# Patient Record
Sex: FEMALE | Race: WHITE | HISPANIC OR LATINO | URBAN - METROPOLITAN AREA
[De-identification: names, ages, dates, MRNs, and addresses within clinical notes are randomized per-mention and may not be internally consistent; named-entity substitution may affect disease eponyms.]

---

## 2017-06-08 ENCOUNTER — EMERGENCY (EMERGENCY)
Facility: HOSPITAL | Age: 27
LOS: 1 days | Discharge: HOME | End: 2017-06-08
Attending: EMERGENCY MEDICINE
Payer: MEDICAID

## 2017-06-08 VITALS
HEART RATE: 80 BPM | OXYGEN SATURATION: 100 % | SYSTOLIC BLOOD PRESSURE: 127 MMHG | HEIGHT: 64 IN | WEIGHT: 214.95 LBS | RESPIRATION RATE: 18 BRPM | DIASTOLIC BLOOD PRESSURE: 86 MMHG | TEMPERATURE: 98 F

## 2017-06-08 DIAGNOSIS — Z79.899 OTHER LONG TERM (CURRENT) DRUG THERAPY: ICD-10-CM

## 2017-06-08 DIAGNOSIS — K62.5 HEMORRHAGE OF ANUS AND RECTUM: ICD-10-CM

## 2017-06-08 DIAGNOSIS — Z98.890 OTHER SPECIFIED POSTPROCEDURAL STATES: ICD-10-CM

## 2017-06-08 DIAGNOSIS — R10.9 UNSPECIFIED ABDOMINAL PAIN: ICD-10-CM

## 2017-06-08 DIAGNOSIS — Z98.1 ARTHRODESIS STATUS: ICD-10-CM

## 2017-06-08 DIAGNOSIS — K64.8 OTHER HEMORRHOIDS: ICD-10-CM

## 2017-06-08 LAB
ALBUMIN SERPL ELPH-MCNC: 4.5 G/DL — SIGNIFICANT CHANGE UP (ref 3.3–5.2)
ALP SERPL-CCNC: 53 U/L — SIGNIFICANT CHANGE UP (ref 40–120)
ALT FLD-CCNC: 15 U/L — SIGNIFICANT CHANGE UP
ANION GAP SERPL CALC-SCNC: 17 MMOL/L — SIGNIFICANT CHANGE UP (ref 5–17)
APPEARANCE UR: CLEAR — SIGNIFICANT CHANGE UP
APTT BLD: 43.1 SEC — HIGH (ref 27.5–37.4)
AST SERPL-CCNC: 12 U/L — SIGNIFICANT CHANGE UP
BACTERIA # UR AUTO: ABNORMAL
BASOPHILS # BLD AUTO: 0 K/UL — SIGNIFICANT CHANGE UP (ref 0–0.2)
BASOPHILS NFR BLD AUTO: 0.3 % — SIGNIFICANT CHANGE UP (ref 0–2)
BILIRUB SERPL-MCNC: 0.5 MG/DL — SIGNIFICANT CHANGE UP (ref 0.4–2)
BILIRUB UR-MCNC: NEGATIVE — SIGNIFICANT CHANGE UP
BUN SERPL-MCNC: 13 MG/DL — SIGNIFICANT CHANGE UP (ref 8–20)
CALCIUM SERPL-MCNC: 9.4 MG/DL — SIGNIFICANT CHANGE UP (ref 8.6–10.2)
CHLORIDE SERPL-SCNC: 98 MMOL/L — SIGNIFICANT CHANGE UP (ref 98–107)
CO2 SERPL-SCNC: 24 MMOL/L — SIGNIFICANT CHANGE UP (ref 22–29)
COLOR SPEC: YELLOW — SIGNIFICANT CHANGE UP
CREAT SERPL-MCNC: 0.68 MG/DL — SIGNIFICANT CHANGE UP (ref 0.5–1.3)
DIFF PNL FLD: NEGATIVE — SIGNIFICANT CHANGE UP
EOSINOPHIL # BLD AUTO: 0.2 K/UL — SIGNIFICANT CHANGE UP (ref 0–0.5)
EOSINOPHIL NFR BLD AUTO: 1.5 % — SIGNIFICANT CHANGE UP (ref 0–6)
EPI CELLS # UR: SIGNIFICANT CHANGE UP
GLUCOSE SERPL-MCNC: 85 MG/DL — SIGNIFICANT CHANGE UP (ref 70–115)
GLUCOSE UR QL: NEGATIVE MG/DL — SIGNIFICANT CHANGE UP
HCG SERPL-ACNC: <2 MIU/ML — SIGNIFICANT CHANGE UP
HCT VFR BLD CALC: 41.8 % — SIGNIFICANT CHANGE UP (ref 37–47)
HGB BLD-MCNC: 13.4 G/DL — SIGNIFICANT CHANGE UP (ref 12–16)
INR BLD: 1.11 RATIO — SIGNIFICANT CHANGE UP (ref 0.88–1.16)
KETONES UR-MCNC: NEGATIVE — SIGNIFICANT CHANGE UP
LEUKOCYTE ESTERASE UR-ACNC: NEGATIVE — SIGNIFICANT CHANGE UP
LIDOCAIN IGE QN: 22 U/L — SIGNIFICANT CHANGE UP (ref 22–51)
LYMPHOCYTES # BLD AUTO: 3.4 K/UL — SIGNIFICANT CHANGE UP (ref 1–4.8)
LYMPHOCYTES # BLD AUTO: 32.1 % — SIGNIFICANT CHANGE UP (ref 20–55)
MCHC RBC-ENTMCNC: 28.4 PG — SIGNIFICANT CHANGE UP (ref 27–31)
MCHC RBC-ENTMCNC: 32.1 G/DL — SIGNIFICANT CHANGE UP (ref 32–36)
MCV RBC AUTO: 88.6 FL — SIGNIFICANT CHANGE UP (ref 81–99)
MONOCYTES # BLD AUTO: 0.8 K/UL — SIGNIFICANT CHANGE UP (ref 0–0.8)
MONOCYTES NFR BLD AUTO: 7.2 % — SIGNIFICANT CHANGE UP (ref 3–10)
NEUTROPHILS # BLD AUTO: 6.1 K/UL — SIGNIFICANT CHANGE UP (ref 1.8–8)
NEUTROPHILS NFR BLD AUTO: 58.6 % — SIGNIFICANT CHANGE UP (ref 37–73)
NITRITE UR-MCNC: NEGATIVE — SIGNIFICANT CHANGE UP
PH UR: 6 — SIGNIFICANT CHANGE UP (ref 5–8)
PLATELET # BLD AUTO: 309 K/UL — SIGNIFICANT CHANGE UP (ref 150–400)
POTASSIUM SERPL-MCNC: 4 MMOL/L — SIGNIFICANT CHANGE UP (ref 3.5–5.3)
POTASSIUM SERPL-SCNC: 4 MMOL/L — SIGNIFICANT CHANGE UP (ref 3.5–5.3)
PROT SERPL-MCNC: 7.7 G/DL — SIGNIFICANT CHANGE UP (ref 6.6–8.7)
PROT UR-MCNC: NEGATIVE MG/DL — SIGNIFICANT CHANGE UP
PROTHROM AB SERPL-ACNC: 12.2 SEC — SIGNIFICANT CHANGE UP (ref 9.8–12.7)
RBC # BLD: 4.72 M/UL — SIGNIFICANT CHANGE UP (ref 4.4–5.2)
RBC # FLD: 13 % — SIGNIFICANT CHANGE UP (ref 11–15.6)
RBC CASTS # UR COMP ASSIST: NEGATIVE /HPF — SIGNIFICANT CHANGE UP (ref 0–4)
SODIUM SERPL-SCNC: 139 MMOL/L — SIGNIFICANT CHANGE UP (ref 135–145)
SP GR SPEC: 1.01 — SIGNIFICANT CHANGE UP (ref 1.01–1.02)
UROBILINOGEN FLD QL: NEGATIVE MG/DL — SIGNIFICANT CHANGE UP
WBC # BLD: 10.4 K/UL — SIGNIFICANT CHANGE UP (ref 4.8–10.8)
WBC # FLD AUTO: 10.4 K/UL — SIGNIFICANT CHANGE UP (ref 4.8–10.8)
WBC UR QL: SIGNIFICANT CHANGE UP

## 2017-06-08 PROCEDURE — 99284 EMERGENCY DEPT VISIT MOD MDM: CPT

## 2017-06-08 RX ORDER — METOCLOPRAMIDE HCL 10 MG
10 TABLET ORAL ONCE
Qty: 0 | Refills: 0 | Status: COMPLETED | OUTPATIENT
Start: 2017-06-08 | End: 2017-06-08

## 2017-06-08 RX ORDER — PANTOPRAZOLE SODIUM 20 MG/1
10 TABLET, DELAYED RELEASE ORAL ONCE
Qty: 0 | Refills: 0 | Status: COMPLETED | OUTPATIENT
Start: 2017-06-08 | End: 2017-06-08

## 2017-06-08 RX ORDER — SODIUM CHLORIDE 9 MG/ML
1000 INJECTION INTRAMUSCULAR; INTRAVENOUS; SUBCUTANEOUS
Qty: 0 | Refills: 0 | Status: DISCONTINUED | OUTPATIENT
Start: 2017-06-08 | End: 2017-06-12

## 2017-06-08 RX ADMIN — SODIUM CHLORIDE 200 MILLILITER(S): 9 INJECTION INTRAMUSCULAR; INTRAVENOUS; SUBCUTANEOUS at 21:35

## 2017-06-08 RX ADMIN — PANTOPRAZOLE SODIUM 10 MILLIGRAM(S): 20 TABLET, DELAYED RELEASE ORAL at 21:32

## 2017-06-08 RX ADMIN — Medication 10 MILLIGRAM(S): at 21:32

## 2017-06-08 NOTE — ED STATDOCS - PROGRESS NOTE DETAILS
Pt denies nay abdominal pain, dizziness, or rectal pain. Pt Labs reviewed and all seems to be within normal limits. Rectal examination and Guaiac examination to be performed by TOBY Magana. joanna michaud note: Rectal exam: (CHANCE CHAPERONE) two external hemorrhoids noted. external rectum looks mildly erythematous. good rectal tone. no internal hemorrhoids noted. soft brown stool noted in rectal vault.

## 2017-06-08 NOTE — ED STATDOCS - MUSCULOSKELETAL FINDINGS, MLM
spine is midline. no leg edema. no calf tenderness./normal range of motion/no muscle tenderness/neck supple

## 2017-06-08 NOTE — ED STATDOCS - ATTENDING CONTRIBUTION TO CARE
I, Chad Sullivan, performed the initial face to face bedside interview with this patient regarding history of present illness, review of symptoms and relevant past medical, social and family history.  I completed an independent physical examination.  I was the initial provider who evaluated this patient. I have signed out the follow up of any pending tests (i.e. labs, radiological studies) to the ACP.  I have communicated the patient’s plan of care and disposition with the ACP.  The history, relevant review of systems, past medical and surgical history, medical decision making, and physical examination was documented by the scribe in my presence and I attest to the accuracy of the documentation.

## 2017-06-08 NOTE — ED STATDOCS - CARE PLAN
Principal Discharge DX:	IBS (irritable bowel syndrome) Principal Discharge DX:	Hemorrhoids  Instructions for follow-up, activity and diet:	Increase Fibre diets and F/u with Hematologist Principal Discharge DX:	Hemorrhoids  Instructions for follow-up, activity and diet:	Increase Fibre diets and F/u with Hematologist  Secondary Diagnosis:	Rectal bleeding

## 2017-06-08 NOTE — ED STATDOCS - NS ED MD SCRIBE ATTENDING SCRIBE SECTIONS
HISTORY OF PRESENT ILLNESS/PAST MEDICAL/SURGICAL/SOCIAL HISTORY/VITAL SIGNS( Pullset)/REVIEW OF SYSTEMS/INTAKE ASSESSMENT/SCREENINGS/HIV/PHYSICAL EXAM/DISPOSITION

## 2017-06-08 NOTE — ED STATDOCS - OBJECTIVE STATEMENT
26 year old female, with hx of IBS, presenting to the ED complaining of abdominal cramps, lower back pain, and bleeding from her rectum. Pt states that she had last eaten at 1400 today, and when she went to move her bowels at 1800, she noticed bleeding from her rectum. Pt states that she developed abdominal pain afterwards. She states that she visited an urgent care center and was sent to the ED for further evaluation. Pt states that she has nausea, but denies having any rectal pain, pain with moving bowels, fever, chills, vomiting, or dysuria. She states that she is not taking any medications and that she does not have any PSHx or PMHx. Pt states that she has NKDA. She states that she is a non-smoker, and non-drinker. No further complaints at this time.

## 2017-06-08 NOTE — ED ADULT NURSE NOTE - DISCHARGE TEACHING
patient givne d/c instructions and instructed to follow up with pmd and hem/onc and to return if worse

## 2017-06-08 NOTE — ED STATDOCS - CHPI ED SYMPTOM NEG
no fever/no dysuria/no vomiting/NO RECTAL PAIN, NO CHILLS no vomiting/no fever/no dysuria/no burning urination/NO RECTAL PAIN, NO CHILLS

## 2017-06-08 NOTE — ED ADULT NURSE NOTE - OBJECTIVE STATEMENT
c/o lower abdominal stomach pain and cramps, that began tonight 1800, reports nausea and anal bleeding, denies any trauma, reports hx of IBS. Denies daily medication use.

## 2017-06-08 NOTE — ED STATDOCS - MEDICAL DECISION MAKING DETAILS
Episode of blood tinged stool x 2 with subsequent suprapubic cramping and nausea. Hx of IBS. Suspicious for same. Sent [place] [facility] [place] work. CBC for anaemia. CMP for liver function. Urine for blood/UTI. Hydrate, treat symptomatically. Formal rectal exam, and reassess.

## 2017-06-09 VITALS
OXYGEN SATURATION: 98 % | HEART RATE: 71 BPM | RESPIRATION RATE: 20 BRPM | DIASTOLIC BLOOD PRESSURE: 81 MMHG | TEMPERATURE: 98 F | SYSTOLIC BLOOD PRESSURE: 119 MMHG

## 2017-06-09 LAB — OB PNL STL: POSITIVE

## 2017-06-09 PROCEDURE — 82272 OCCULT BLD FECES 1-3 TESTS: CPT

## 2017-06-09 PROCEDURE — 85027 COMPLETE CBC AUTOMATED: CPT

## 2017-06-09 PROCEDURE — 83690 ASSAY OF LIPASE: CPT

## 2017-06-09 PROCEDURE — 99284 EMERGENCY DEPT VISIT MOD MDM: CPT | Mod: 25

## 2017-06-09 PROCEDURE — 96374 THER/PROPH/DIAG INJ IV PUSH: CPT

## 2017-06-09 PROCEDURE — 85730 THROMBOPLASTIN TIME PARTIAL: CPT

## 2017-06-09 PROCEDURE — 85610 PROTHROMBIN TIME: CPT

## 2017-06-09 PROCEDURE — 84702 CHORIONIC GONADOTROPIN TEST: CPT

## 2017-06-09 PROCEDURE — 80053 COMPREHEN METABOLIC PANEL: CPT

## 2017-06-09 PROCEDURE — 81001 URINALYSIS AUTO W/SCOPE: CPT

## 2017-06-09 PROCEDURE — 96375 TX/PRO/DX INJ NEW DRUG ADDON: CPT

## 2017-06-10 ENCOUNTER — TRANSCRIPTION ENCOUNTER (OUTPATIENT)
Age: 27
End: 2017-06-10

## 2017-12-05 NOTE — ED ADULT TRIAGE NOTE - CHIEF COMPLAINT QUOTE
Assessment:  EPIRETINAL MEMBRANE    Plan:  Take home [employer].  Patient to call immediately with any change and   Scan Macular OCT,   21 LINE RASTER SHOWS , THICKENED RETINA, AND , MACULAR PUCKER , AND NO HOLE, STABLE TO LAST VISIT 11/6/2017 CENTRAL 449/297, TODAY  453/304   c/o stomach pain and cramps, nausea and anal bleeding, denies any trauman  hx of IBS

## 2020-02-26 PROBLEM — K58.9 IRRITABLE BOWEL SYNDROME WITHOUT DIARRHEA: Chronic | Status: ACTIVE | Noted: 2017-06-08

## 2020-03-09 ENCOUNTER — APPOINTMENT (OUTPATIENT)
Dept: PEDIATRIC CARDIOLOGY | Facility: CLINIC | Age: 30
End: 2020-03-09
Payer: MEDICAID

## 2020-03-09 PROCEDURE — 99201 OFFICE OUTPATIENT NEW 10 MINUTES: CPT | Mod: 25

## 2020-03-09 PROCEDURE — 76825 ECHO EXAM OF FETAL HEART: CPT

## 2020-03-09 PROCEDURE — 93325 DOPPLER ECHO COLOR FLOW MAPG: CPT

## 2020-03-09 PROCEDURE — 76827 ECHO EXAM OF FETAL HEART: CPT

## 2021-01-13 ENCOUNTER — TRANSCRIPTION ENCOUNTER (OUTPATIENT)
Age: 31
End: 2021-01-13

## 2022-10-05 PROBLEM — Z00.00 ENCOUNTER FOR PREVENTIVE HEALTH EXAMINATION: Noted: 2022-10-05

## 2022-10-17 ENCOUNTER — APPOINTMENT (OUTPATIENT)
Dept: ORTHOPEDIC SURGERY | Facility: CLINIC | Age: 32
End: 2022-10-17

## 2022-10-17 VITALS — BODY MASS INDEX: 39.27 KG/M2 | WEIGHT: 230 LBS | HEIGHT: 64 IN

## 2022-10-17 DIAGNOSIS — M22.42 CHONDROMALACIA PATELLAE, RIGHT KNEE: ICD-10-CM

## 2022-10-17 DIAGNOSIS — M22.41 CHONDROMALACIA PATELLAE, RIGHT KNEE: ICD-10-CM

## 2022-10-17 PROCEDURE — 99203 OFFICE O/P NEW LOW 30 MIN: CPT

## 2022-10-17 PROCEDURE — 73562 X-RAY EXAM OF KNEE 3: CPT | Mod: 50

## 2022-10-17 PROCEDURE — 73620 X-RAY EXAM OF FOOT: CPT | Mod: 50

## 2022-10-17 PROCEDURE — 73610 X-RAY EXAM OF ANKLE: CPT | Mod: 50

## 2022-10-17 NOTE — DISCUSSION/SUMMARY
[de-identified] : Would do PT for knees. Ankles/feet need to be supported with orthotic/good sneaker

## 2022-10-17 NOTE — HISTORY OF PRESENT ILLNESS
[10] : 10 [7] : 7 [Radiating] : radiating [Constant] : constant [Sleep] : sleep [de-identified] : Has pain both knees for long period of time, no recent injury. No clicking, locking or giving way. Also has pain in both feet/ankles.  [] : no [FreeTextEntry1] : b/l knees/feet [FreeTextEntry3] : about a year  [FreeTextEntry5] : no injury, patient is feeling increasing pain in the knees/feet. Left side is worse than right.

## 2022-12-12 ENCOUNTER — APPOINTMENT (OUTPATIENT)
Dept: ORTHOPEDIC SURGERY | Facility: CLINIC | Age: 32
End: 2022-12-12

## 2023-03-16 ENCOUNTER — APPOINTMENT (OUTPATIENT)
Dept: OBGYN | Facility: CLINIC | Age: 33
End: 2023-03-16
Payer: MEDICAID

## 2023-03-16 VITALS
HEART RATE: 102 BPM | WEIGHT: 245 LBS | BODY MASS INDEX: 41.83 KG/M2 | DIASTOLIC BLOOD PRESSURE: 74 MMHG | SYSTOLIC BLOOD PRESSURE: 118 MMHG | HEIGHT: 64 IN

## 2023-03-16 DIAGNOSIS — G43.909 MIGRAINE, UNSPECIFIED, NOT INTRACTABLE, W/OUT STATUS MIGRAINOSUS: ICD-10-CM

## 2023-03-16 DIAGNOSIS — Z83.3 FAMILY HISTORY OF DIABETES MELLITUS: ICD-10-CM

## 2023-03-16 DIAGNOSIS — R63.8 OTHER SYMPTOMS AND SIGNS CONCERNING FOOD AND FLUID INTAKE: ICD-10-CM

## 2023-03-16 DIAGNOSIS — U07.1 COVID-19: ICD-10-CM

## 2023-03-16 DIAGNOSIS — Z82.49 FAMILY HISTORY OF ISCHEMIC HEART DISEASE AND OTHER DISEASES OF THE CIRCULATORY SYSTEM: ICD-10-CM

## 2023-03-16 DIAGNOSIS — M54.30 SCIATICA, UNSPECIFIED SIDE: ICD-10-CM

## 2023-03-16 DIAGNOSIS — Z98.890 OTHER SPECIFIED POSTPROCEDURAL STATES: ICD-10-CM

## 2023-03-16 DIAGNOSIS — M51.26 OTHER INTERVERTEBRAL DISC DISPLACEMENT, LUMBAR REGION: ICD-10-CM

## 2023-03-16 PROCEDURE — 0500F INITIAL PRENATAL CARE VISIT: CPT

## 2023-03-16 RX ORDER — PRENATAL VIT 49/IRON FUM/FOLIC 6.75-0.2MG
TABLET ORAL
Refills: 0 | Status: ACTIVE | COMMUNITY

## 2023-03-16 NOTE — PLAN
[FreeTextEntry1] : Patient is a 32-year-old  3 para 1-0-1-1 last menstrual period 2022 with an EDC of 2023\par Patient presents for initial OB visit as a transfer patient for her continued prenatal care\par Physical exam reveals a well-developed well-nourished female no apparent distress,,, BMI 43\par Heart regular rhythm and rate, lungs clear, breast no mass nontender no skin changes or nipple discharge no adenopathy, abdomen soft nontender evidence of a gravid uterus\par Pelvic Yoel shows normal female external genitalia, vagina lesions, cervix appropriate size closed not tender, uterus anteverted bulky consistent with a gravid uterus\par Pap smear not performed\par Patient will transfer her prenatal records including Pap smear results and all blood work from her previous office to have continued care at this point\par Patient is scheduled for level 2 sonogram and appropriate paperwork and referrals given for her to be referred to maternal-fetal medicine at [facility]\par \par Past medical history,,, anemia, history of COVID back in 2021 denies being vaccinated\par Past surgical history ,,, D&C x1\par Allergies,,, patient denies\par Medications,,, prenatal vitamins,, patient is advised to consider adding folic acid supplement\par Past OB history,, vaginal delivery x1 with a vacuum assistance, spontaneous AB x1\par Past GYN history,,, menarche age 7, tubal 27, duration 5 to 6 days, patient denies a history of birth control pill use, history of PID or STDs\par Tobacco use,,, patient denies\par Alcohol use,,, patient denies\par Drug use,,, patient denies\par Family history,, mother alive  with history of diabetes mellitus and hypertension,, father alive history hypertension\par Patient denies any family history of breast or ovarian cancer

## 2023-03-16 NOTE — HISTORY OF PRESENT ILLNESS
[FreeTextEntry1] : Patient is a 32-year-old  3 para 1-0-1-1 last menstrual period 2022 with an EDC of 2023\par Patient presents as a transfer OB patient for her continued prenatal care

## 2023-03-16 NOTE — PHYSICAL EXAM
[Chaperone Present] : A chaperone was present in the examining room during all aspects of the physical examination [Appropriately responsive] : appropriately responsive [Alert] : alert [No Acute Distress] : no acute distress [No Lymphadenopathy] : no lymphadenopathy [Regular Rate Rhythm] : regular rate rhythm [No Murmurs] : no murmurs [Clear to Auscultation B/L] : clear to auscultation bilaterally [Soft] : soft [Non-tender] : non-tender [Non-distended] : non-distended [No HSM] : No HSM [No Lesions] : no lesions [Oriented x3] : oriented x3 [FreeTextEntry7] : Gravid [Examination Of The Breasts] : a normal appearance [No Masses] : no breast masses were palpable [Labia Majora] : normal [Labia Minora] : normal [Normal] : normal [Tenderness] : nontender [Mass ___ cm] : no uterine mass was palpated [FreeTextEntry6] : Gravid

## 2023-03-17 ENCOUNTER — NON-APPOINTMENT (OUTPATIENT)
Age: 33
End: 2023-03-17

## 2023-03-23 ENCOUNTER — ASOB RESULT (OUTPATIENT)
Age: 33
End: 2023-03-23

## 2023-03-23 ENCOUNTER — NON-APPOINTMENT (OUTPATIENT)
Age: 33
End: 2023-03-23

## 2023-03-23 ENCOUNTER — APPOINTMENT (OUTPATIENT)
Dept: ANTEPARTUM | Facility: CLINIC | Age: 33
End: 2023-03-23
Payer: MEDICAID

## 2023-03-23 PROCEDURE — 76811 OB US DETAILED SNGL FETUS: CPT

## 2023-04-13 ENCOUNTER — NON-APPOINTMENT (OUTPATIENT)
Age: 33
End: 2023-04-13

## 2023-04-13 ENCOUNTER — APPOINTMENT (OUTPATIENT)
Dept: OBGYN | Facility: CLINIC | Age: 33
End: 2023-04-13
Payer: MEDICAID

## 2023-04-13 VITALS
HEART RATE: 121 BPM | HEIGHT: 64 IN | DIASTOLIC BLOOD PRESSURE: 80 MMHG | WEIGHT: 246 LBS | BODY MASS INDEX: 42 KG/M2 | SYSTOLIC BLOOD PRESSURE: 126 MMHG

## 2023-04-13 PROCEDURE — 0502F SUBSEQUENT PRENATAL CARE: CPT

## 2023-04-14 ENCOUNTER — APPOINTMENT (OUTPATIENT)
Dept: ANTEPARTUM | Facility: CLINIC | Age: 33
End: 2023-04-14
Payer: MEDICAID

## 2023-04-14 ENCOUNTER — ASOB RESULT (OUTPATIENT)
Age: 33
End: 2023-04-14

## 2023-04-14 PROCEDURE — 76816 OB US FOLLOW-UP PER FETUS: CPT

## 2023-04-17 ENCOUNTER — NON-APPOINTMENT (OUTPATIENT)
Age: 33
End: 2023-04-17

## 2023-05-12 ENCOUNTER — ASOB RESULT (OUTPATIENT)
Age: 33
End: 2023-05-12

## 2023-05-12 ENCOUNTER — APPOINTMENT (OUTPATIENT)
Dept: ANTEPARTUM | Facility: CLINIC | Age: 33
End: 2023-05-12
Payer: MEDICAID

## 2023-05-12 PROCEDURE — 76816 OB US FOLLOW-UP PER FETUS: CPT

## 2023-05-15 ENCOUNTER — APPOINTMENT (OUTPATIENT)
Dept: OBGYN | Facility: CLINIC | Age: 33
End: 2023-05-15
Payer: MEDICAID

## 2023-05-15 VITALS
WEIGHT: 251 LBS | HEART RATE: 108 BPM | SYSTOLIC BLOOD PRESSURE: 120 MMHG | DIASTOLIC BLOOD PRESSURE: 81 MMHG | BODY MASS INDEX: 43.08 KG/M2

## 2023-05-15 PROCEDURE — 0502F SUBSEQUENT PRENATAL CARE: CPT

## 2023-05-16 LAB
BILIRUB UR QL STRIP: NORMAL
GLUCOSE UR-MCNC: NORMAL
HCG UR QL: 0.2 EU/DL
HGB UR QL STRIP.AUTO: NORMAL
KETONES UR-MCNC: NORMAL
LEUKOCYTE ESTERASE UR QL STRIP: NORMAL
NITRITE UR QL STRIP: NORMAL
PH UR STRIP: 6
PROT UR STRIP-MCNC: NORMAL
SP GR UR STRIP: 1.03

## 2023-05-18 ENCOUNTER — APPOINTMENT (OUTPATIENT)
Dept: OBGYN | Facility: CLINIC | Age: 33
End: 2023-05-18
Payer: MEDICAID

## 2023-05-18 VITALS
BODY MASS INDEX: 43.02 KG/M2 | SYSTOLIC BLOOD PRESSURE: 122 MMHG | HEART RATE: 111 BPM | HEIGHT: 64 IN | DIASTOLIC BLOOD PRESSURE: 82 MMHG | WEIGHT: 252 LBS

## 2023-05-18 PROCEDURE — 0502F SUBSEQUENT PRENATAL CARE: CPT

## 2023-05-30 ENCOUNTER — APPOINTMENT (OUTPATIENT)
Dept: MATERNAL FETAL MEDICINE | Facility: CLINIC | Age: 33
End: 2023-05-30
Payer: MEDICAID

## 2023-05-30 ENCOUNTER — ASOB RESULT (OUTPATIENT)
Age: 33
End: 2023-05-30

## 2023-05-30 PROCEDURE — G0108 DIAB MANAGE TRN  PER INDIV: CPT | Mod: 95

## 2023-05-30 RX ORDER — LANCETS 28 GAUGE
EACH MISCELLANEOUS
Qty: 1 | Refills: 3 | Status: ACTIVE | COMMUNITY
Start: 2023-05-30 | End: 1900-01-01

## 2023-05-30 RX ORDER — BLOOD SUGAR DIAGNOSTIC
STRIP MISCELLANEOUS 4 TIMES DAILY
Qty: 2 | Refills: 3 | Status: ACTIVE | COMMUNITY
Start: 2023-05-30 | End: 1900-01-01

## 2023-05-30 RX ORDER — BLOOD-GLUCOSE METER
W/DEVICE KIT MISCELLANEOUS
Qty: 1 | Refills: 0 | Status: ACTIVE | COMMUNITY
Start: 2023-05-30 | End: 1900-01-01

## 2023-06-01 ENCOUNTER — NON-APPOINTMENT (OUTPATIENT)
Age: 33
End: 2023-06-01

## 2023-06-05 ENCOUNTER — APPOINTMENT (OUTPATIENT)
Dept: OBGYN | Facility: CLINIC | Age: 33
End: 2023-06-05
Payer: MEDICAID

## 2023-06-05 VITALS
HEIGHT: 64 IN | DIASTOLIC BLOOD PRESSURE: 74 MMHG | HEART RATE: 101 BPM | WEIGHT: 250 LBS | SYSTOLIC BLOOD PRESSURE: 112 MMHG | BODY MASS INDEX: 42.68 KG/M2

## 2023-06-05 PROCEDURE — 0502F SUBSEQUENT PRENATAL CARE: CPT

## 2023-06-06 ENCOUNTER — APPOINTMENT (OUTPATIENT)
Dept: MATERNAL FETAL MEDICINE | Facility: CLINIC | Age: 33
End: 2023-06-06

## 2023-06-16 ENCOUNTER — APPOINTMENT (OUTPATIENT)
Dept: MATERNAL FETAL MEDICINE | Facility: CLINIC | Age: 33
End: 2023-06-16

## 2023-06-20 ENCOUNTER — APPOINTMENT (OUTPATIENT)
Dept: OBGYN | Facility: CLINIC | Age: 33
End: 2023-06-20
Payer: MEDICAID

## 2023-06-20 ENCOUNTER — NON-APPOINTMENT (OUTPATIENT)
Age: 33
End: 2023-06-20

## 2023-06-20 VITALS
DIASTOLIC BLOOD PRESSURE: 77 MMHG | WEIGHT: 252 LBS | BODY MASS INDEX: 43.26 KG/M2 | SYSTOLIC BLOOD PRESSURE: 112 MMHG | HEART RATE: 120 BPM

## 2023-06-20 PROCEDURE — 0502F SUBSEQUENT PRENATAL CARE: CPT

## 2023-06-22 LAB
C TRACH RRNA SPEC QL NAA+PROBE: NOT DETECTED
HPV HIGH+LOW RISK DNA PNL CVX: NOT DETECTED
N GONORRHOEA RRNA SPEC QL NAA+PROBE: NOT DETECTED
SOURCE TP AMPLIFICATION: NORMAL

## 2023-06-23 ENCOUNTER — NON-APPOINTMENT (OUTPATIENT)
Age: 33
End: 2023-06-23

## 2023-06-23 ENCOUNTER — ASOB RESULT (OUTPATIENT)
Age: 33
End: 2023-06-23

## 2023-06-23 ENCOUNTER — APPOINTMENT (OUTPATIENT)
Dept: ANTEPARTUM | Facility: CLINIC | Age: 33
End: 2023-06-23
Payer: MEDICAID

## 2023-06-23 LAB — CYTOLOGY CVX/VAG DOC THIN PREP: NORMAL

## 2023-06-23 PROCEDURE — 76816 OB US FOLLOW-UP PER FETUS: CPT

## 2023-06-23 PROCEDURE — 76819 FETAL BIOPHYS PROFIL W/O NST: CPT

## 2023-07-03 ENCOUNTER — NON-APPOINTMENT (OUTPATIENT)
Age: 33
End: 2023-07-03

## 2023-07-03 ENCOUNTER — APPOINTMENT (OUTPATIENT)
Dept: OBGYN | Facility: CLINIC | Age: 33
End: 2023-07-03
Payer: MEDICAID

## 2023-07-03 VITALS
HEART RATE: 99 BPM | HEIGHT: 64 IN | SYSTOLIC BLOOD PRESSURE: 107 MMHG | DIASTOLIC BLOOD PRESSURE: 74 MMHG | WEIGHT: 253 LBS | BODY MASS INDEX: 43.19 KG/M2

## 2023-07-03 PROCEDURE — 0502F SUBSEQUENT PRENATAL CARE: CPT

## 2023-07-05 LAB
GP B STREP DNA SPEC QL NAA+PROBE: NOT DETECTED
SOURCE GBS: NORMAL

## 2023-07-07 ENCOUNTER — APPOINTMENT (OUTPATIENT)
Dept: ANTEPARTUM | Facility: CLINIC | Age: 33
End: 2023-07-07
Payer: MEDICAID

## 2023-07-07 ENCOUNTER — ASOB RESULT (OUTPATIENT)
Age: 33
End: 2023-07-07

## 2023-07-07 PROCEDURE — 76819 FETAL BIOPHYS PROFIL W/O NST: CPT

## 2023-07-10 ENCOUNTER — NON-APPOINTMENT (OUTPATIENT)
Age: 33
End: 2023-07-10

## 2023-07-10 ENCOUNTER — APPOINTMENT (OUTPATIENT)
Dept: OBGYN | Facility: CLINIC | Age: 33
End: 2023-07-10
Payer: MEDICAID

## 2023-07-10 VITALS
HEART RATE: 118 BPM | DIASTOLIC BLOOD PRESSURE: 80 MMHG | HEIGHT: 64 IN | WEIGHT: 254 LBS | SYSTOLIC BLOOD PRESSURE: 114 MMHG | BODY MASS INDEX: 43.36 KG/M2

## 2023-07-10 DIAGNOSIS — Z3A.35 35 WEEKS GESTATION OF PREGNANCY: ICD-10-CM

## 2023-07-10 PROCEDURE — 0502F SUBSEQUENT PRENATAL CARE: CPT

## 2023-07-14 ENCOUNTER — ASOB RESULT (OUTPATIENT)
Age: 33
End: 2023-07-14

## 2023-07-14 ENCOUNTER — APPOINTMENT (OUTPATIENT)
Dept: ANTEPARTUM | Facility: CLINIC | Age: 33
End: 2023-07-14
Payer: MEDICAID

## 2023-07-14 PROCEDURE — 76818 FETAL BIOPHYS PROFILE W/NST: CPT

## 2023-07-17 ENCOUNTER — NON-APPOINTMENT (OUTPATIENT)
Age: 33
End: 2023-07-17

## 2023-07-17 ENCOUNTER — APPOINTMENT (OUTPATIENT)
Dept: OBGYN | Facility: CLINIC | Age: 33
End: 2023-07-17
Payer: MEDICAID

## 2023-07-17 VITALS
WEIGHT: 256 LBS | HEART RATE: 105 BPM | BODY MASS INDEX: 43.71 KG/M2 | DIASTOLIC BLOOD PRESSURE: 78 MMHG | HEIGHT: 64 IN | SYSTOLIC BLOOD PRESSURE: 113 MMHG

## 2023-07-17 DIAGNOSIS — Z3A.36 36 WEEKS GESTATION OF PREGNANCY: ICD-10-CM

## 2023-07-17 PROCEDURE — 0502F SUBSEQUENT PRENATAL CARE: CPT

## 2023-07-21 ENCOUNTER — ASOB RESULT (OUTPATIENT)
Age: 33
End: 2023-07-21

## 2023-07-21 ENCOUNTER — APPOINTMENT (OUTPATIENT)
Dept: ANTEPARTUM | Facility: CLINIC | Age: 33
End: 2023-07-21
Payer: MEDICAID

## 2023-07-21 PROCEDURE — 76818 FETAL BIOPHYS PROFILE W/NST: CPT

## 2023-07-21 PROCEDURE — 76816 OB US FOLLOW-UP PER FETUS: CPT

## 2023-07-24 ENCOUNTER — APPOINTMENT (OUTPATIENT)
Dept: OBGYN | Facility: CLINIC | Age: 33
End: 2023-07-24
Payer: MEDICAID

## 2023-07-24 VITALS
HEIGHT: 64 IN | HEART RATE: 123 BPM | WEIGHT: 258 LBS | DIASTOLIC BLOOD PRESSURE: 85 MMHG | SYSTOLIC BLOOD PRESSURE: 126 MMHG | BODY MASS INDEX: 44.05 KG/M2

## 2023-07-24 PROCEDURE — 0502F SUBSEQUENT PRENATAL CARE: CPT

## 2023-07-28 ENCOUNTER — APPOINTMENT (OUTPATIENT)
Dept: ANTEPARTUM | Facility: CLINIC | Age: 33
End: 2023-07-28
Payer: MEDICAID

## 2023-07-28 ENCOUNTER — ASOB RESULT (OUTPATIENT)
Age: 33
End: 2023-07-28

## 2023-07-28 PROCEDURE — 76818 FETAL BIOPHYS PROFILE W/NST: CPT

## 2023-07-28 PROCEDURE — 76816 OB US FOLLOW-UP PER FETUS: CPT

## 2023-07-31 ENCOUNTER — NON-APPOINTMENT (OUTPATIENT)
Age: 33
End: 2023-07-31

## 2023-07-31 ENCOUNTER — APPOINTMENT (OUTPATIENT)
Dept: OBGYN | Facility: CLINIC | Age: 33
End: 2023-07-31
Payer: MEDICAID

## 2023-07-31 VITALS
WEIGHT: 258 LBS | SYSTOLIC BLOOD PRESSURE: 110 MMHG | BODY MASS INDEX: 44.05 KG/M2 | HEIGHT: 64 IN | HEART RATE: 101 BPM | DIASTOLIC BLOOD PRESSURE: 79 MMHG

## 2023-07-31 DIAGNOSIS — Z3A.37 37 WEEKS GESTATION OF PREGNANCY: ICD-10-CM

## 2023-07-31 PROCEDURE — 81002 URINALYSIS NONAUTO W/O SCOPE: CPT | Mod: NC

## 2023-07-31 PROCEDURE — 0502F SUBSEQUENT PRENATAL CARE: CPT

## 2023-08-03 ENCOUNTER — APPOINTMENT (OUTPATIENT)
Dept: OBGYN | Facility: CLINIC | Age: 33
End: 2023-08-03

## 2023-08-03 ENCOUNTER — APPOINTMENT (OUTPATIENT)
Dept: OBGYN | Facility: CLINIC | Age: 33
End: 2023-08-03
Payer: MEDICAID

## 2023-08-03 ENCOUNTER — TRANSCRIPTION ENCOUNTER (OUTPATIENT)
Age: 33
End: 2023-08-03

## 2023-08-03 VITALS
DIASTOLIC BLOOD PRESSURE: 85 MMHG | SYSTOLIC BLOOD PRESSURE: 118 MMHG | HEIGHT: 64 IN | BODY MASS INDEX: 43.87 KG/M2 | HEART RATE: 112 BPM | WEIGHT: 257 LBS

## 2023-08-03 PROCEDURE — 0502F SUBSEQUENT PRENATAL CARE: CPT

## 2023-08-03 PROCEDURE — 81002 URINALYSIS NONAUTO W/O SCOPE: CPT | Mod: NC

## 2023-08-04 ENCOUNTER — APPOINTMENT (OUTPATIENT)
Dept: ANTEPARTUM | Facility: CLINIC | Age: 33
End: 2023-08-04

## 2023-08-04 ENCOUNTER — APPOINTMENT (OUTPATIENT)
Dept: OBGYN | Facility: HOSPITAL | Age: 33
End: 2023-08-04
Payer: MEDICAID

## 2023-08-04 ENCOUNTER — INPATIENT (INPATIENT)
Facility: HOSPITAL | Age: 33
LOS: 1 days | Discharge: HOME | End: 2023-08-06
Attending: OBSTETRICS & GYNECOLOGY | Admitting: OBSTETRICS & GYNECOLOGY
Payer: MEDICAID

## 2023-08-04 ENCOUNTER — NON-APPOINTMENT (OUTPATIENT)
Age: 33
End: 2023-08-04

## 2023-08-04 VITALS
DIASTOLIC BLOOD PRESSURE: 73 MMHG | SYSTOLIC BLOOD PRESSURE: 126 MMHG | TEMPERATURE: 98 F | WEIGHT: 257.06 LBS | RESPIRATION RATE: 16 BRPM | OXYGEN SATURATION: 99 % | HEIGHT: 64 IN | HEART RATE: 87 BPM

## 2023-08-04 DIAGNOSIS — Z3A.39 39 WEEKS GESTATION OF PREGNANCY: ICD-10-CM

## 2023-08-04 LAB
BASOPHILS # BLD AUTO: 0.03 K/UL — SIGNIFICANT CHANGE UP (ref 0–0.2)
BASOPHILS NFR BLD AUTO: 0.2 % — SIGNIFICANT CHANGE UP (ref 0–2)
BLD GP AB SCN SERPL QL: SIGNIFICANT CHANGE UP
EOSINOPHIL # BLD AUTO: 0.06 K/UL — SIGNIFICANT CHANGE UP (ref 0–0.5)
EOSINOPHIL NFR BLD AUTO: 0.5 % — SIGNIFICANT CHANGE UP (ref 0–6)
HCT VFR BLD CALC: 40.6 % — SIGNIFICANT CHANGE UP (ref 34.5–45)
HGB BLD-MCNC: 13.6 G/DL — SIGNIFICANT CHANGE UP (ref 11.5–15.5)
IMM GRANULOCYTES NFR BLD AUTO: 0.5 % — SIGNIFICANT CHANGE UP (ref 0–0.9)
LYMPHOCYTES # BLD AUTO: 17.5 % — SIGNIFICANT CHANGE UP (ref 13–44)
LYMPHOCYTES # BLD AUTO: 2.11 K/UL — SIGNIFICANT CHANGE UP (ref 1–3.3)
MCHC RBC-ENTMCNC: 28.9 PG — SIGNIFICANT CHANGE UP (ref 27–34)
MCHC RBC-ENTMCNC: 33.5 GM/DL — SIGNIFICANT CHANGE UP (ref 32–36)
MCV RBC AUTO: 86.4 FL — SIGNIFICANT CHANGE UP (ref 80–100)
MONOCYTES # BLD AUTO: 0.89 K/UL — SIGNIFICANT CHANGE UP (ref 0–0.9)
MONOCYTES NFR BLD AUTO: 7.4 % — SIGNIFICANT CHANGE UP (ref 2–14)
NEUTROPHILS # BLD AUTO: 8.92 K/UL — HIGH (ref 1.8–7.4)
NEUTROPHILS NFR BLD AUTO: 73.9 % — SIGNIFICANT CHANGE UP (ref 43–77)
PLATELET # BLD AUTO: 256 K/UL — SIGNIFICANT CHANGE UP (ref 150–400)
RBC # BLD: 4.7 M/UL — SIGNIFICANT CHANGE UP (ref 3.8–5.2)
RBC # FLD: 14.5 % — SIGNIFICANT CHANGE UP (ref 10.3–14.5)
WBC # BLD: 12.07 K/UL — HIGH (ref 3.8–10.5)
WBC # FLD AUTO: 12.07 K/UL — HIGH (ref 3.8–10.5)

## 2023-08-04 PROCEDURE — 59515 CESAREAN DELIVERY: CPT | Mod: U9

## 2023-08-04 PROCEDURE — 59025 FETAL NON-STRESS TEST: CPT | Mod: 26

## 2023-08-04 RX ORDER — ACETAMINOPHEN 500 MG
975 TABLET ORAL
Refills: 0 | Status: DISCONTINUED | OUTPATIENT
Start: 2023-08-04 | End: 2023-08-06

## 2023-08-04 RX ORDER — SIMETHICONE 80 MG/1
80 TABLET, CHEWABLE ORAL EVERY 4 HOURS
Refills: 0 | Status: DISCONTINUED | OUTPATIENT
Start: 2023-08-04 | End: 2023-08-06

## 2023-08-04 RX ORDER — FAMOTIDINE 10 MG/ML
20 INJECTION INTRAVENOUS ONCE
Refills: 0 | Status: COMPLETED | OUTPATIENT
Start: 2023-08-04 | End: 2023-08-04

## 2023-08-04 RX ORDER — SODIUM CHLORIDE 9 MG/ML
1000 INJECTION, SOLUTION INTRAVENOUS
Refills: 0 | Status: DISCONTINUED | OUTPATIENT
Start: 2023-08-04 | End: 2023-08-05

## 2023-08-04 RX ORDER — SCOPALAMINE 1 MG/3D
1 PATCH, EXTENDED RELEASE TRANSDERMAL ONCE
Refills: 0 | Status: COMPLETED | OUTPATIENT
Start: 2023-08-04 | End: 2023-08-04

## 2023-08-04 RX ORDER — LANOLIN
1 OINTMENT (GRAM) TOPICAL EVERY 6 HOURS
Refills: 0 | Status: DISCONTINUED | OUTPATIENT
Start: 2023-08-04 | End: 2023-08-06

## 2023-08-04 RX ORDER — ACETAMINOPHEN 500 MG
975 TABLET ORAL ONCE
Refills: 0 | Status: COMPLETED | OUTPATIENT
Start: 2023-08-04 | End: 2023-08-04

## 2023-08-04 RX ORDER — CITRIC ACID/SODIUM CITRATE 300-500 MG
30 SOLUTION, ORAL ORAL ONCE
Refills: 0 | Status: COMPLETED | OUTPATIENT
Start: 2023-08-04 | End: 2023-08-04

## 2023-08-04 RX ORDER — MAGNESIUM HYDROXIDE 400 MG/1
30 TABLET, CHEWABLE ORAL
Refills: 0 | Status: DISCONTINUED | OUTPATIENT
Start: 2023-08-04 | End: 2023-08-06

## 2023-08-04 RX ORDER — FERROUS SULFATE 325(65) MG
325 TABLET ORAL DAILY
Refills: 0 | Status: DISCONTINUED | OUTPATIENT
Start: 2023-08-04 | End: 2023-08-06

## 2023-08-04 RX ORDER — CEFAZOLIN SODIUM 1 G
2000 VIAL (EA) INJECTION ONCE
Refills: 0 | Status: COMPLETED | OUTPATIENT
Start: 2023-08-04 | End: 2023-08-04

## 2023-08-04 RX ORDER — SODIUM CHLORIDE 9 MG/ML
1000 INJECTION, SOLUTION INTRAVENOUS ONCE
Refills: 0 | Status: COMPLETED | OUTPATIENT
Start: 2023-08-04 | End: 2023-08-04

## 2023-08-04 RX ORDER — IBUPROFEN 200 MG
600 TABLET ORAL EVERY 6 HOURS
Refills: 0 | Status: COMPLETED | OUTPATIENT
Start: 2023-08-04 | End: 2024-07-02

## 2023-08-04 RX ORDER — OXYCODONE HYDROCHLORIDE 5 MG/1
5 TABLET ORAL
Refills: 0 | Status: DISCONTINUED | OUTPATIENT
Start: 2023-08-04 | End: 2023-08-06

## 2023-08-04 RX ORDER — OXYTOCIN 10 UNIT/ML
333.33 VIAL (ML) INJECTION
Qty: 20 | Refills: 0 | Status: DISCONTINUED | OUTPATIENT
Start: 2023-08-04 | End: 2023-08-06

## 2023-08-04 RX ORDER — SODIUM CHLORIDE 9 MG/ML
1000 INJECTION, SOLUTION INTRAVENOUS
Refills: 0 | Status: DISCONTINUED | OUTPATIENT
Start: 2023-08-04 | End: 2023-08-04

## 2023-08-04 RX ORDER — FOLIC ACID 0.8 MG
1 TABLET ORAL DAILY
Refills: 0 | Status: DISCONTINUED | OUTPATIENT
Start: 2023-08-04 | End: 2023-08-06

## 2023-08-04 RX ORDER — DIPHENHYDRAMINE HCL 50 MG
25 CAPSULE ORAL EVERY 6 HOURS
Refills: 0 | Status: DISCONTINUED | OUTPATIENT
Start: 2023-08-04 | End: 2023-08-06

## 2023-08-04 RX ORDER — KETOROLAC TROMETHAMINE 30 MG/ML
30 SYRINGE (ML) INJECTION EVERY 6 HOURS
Refills: 0 | Status: DISCONTINUED | OUTPATIENT
Start: 2023-08-04 | End: 2023-08-05

## 2023-08-04 RX ORDER — ENOXAPARIN SODIUM 100 MG/ML
60 INJECTION SUBCUTANEOUS EVERY 24 HOURS
Refills: 0 | Status: DISCONTINUED | OUTPATIENT
Start: 2023-08-04 | End: 2023-08-06

## 2023-08-04 RX ORDER — CEFAZOLIN SODIUM 1 G
2000 VIAL (EA) INJECTION ONCE
Refills: 0 | Status: DISCONTINUED | OUTPATIENT
Start: 2023-08-04 | End: 2023-08-04

## 2023-08-04 RX ORDER — TETANUS TOXOID, REDUCED DIPHTHERIA TOXOID AND ACELLULAR PERTUSSIS VACCINE, ADSORBED 5; 2.5; 8; 8; 2.5 [IU]/.5ML; [IU]/.5ML; UG/.5ML; UG/.5ML; UG/.5ML
0.5 SUSPENSION INTRAMUSCULAR ONCE
Refills: 0 | Status: DISCONTINUED | OUTPATIENT
Start: 2023-08-04 | End: 2023-08-06

## 2023-08-04 RX ORDER — OXYCODONE HYDROCHLORIDE 5 MG/1
5 TABLET ORAL ONCE
Refills: 0 | Status: DISCONTINUED | OUTPATIENT
Start: 2023-08-04 | End: 2023-08-06

## 2023-08-04 RX ADMIN — Medication 30 MILLILITER(S): at 17:59

## 2023-08-04 RX ADMIN — SCOPALAMINE 1 PATCH: 1 PATCH, EXTENDED RELEASE TRANSDERMAL at 18:00

## 2023-08-04 RX ADMIN — Medication 30 MILLIGRAM(S): at 23:41

## 2023-08-04 RX ADMIN — Medication 975 MILLIGRAM(S): at 18:59

## 2023-08-04 RX ADMIN — Medication 30 MILLIGRAM(S): at 19:00

## 2023-08-04 RX ADMIN — Medication 1000 MILLIUNIT(S)/MIN: at 18:49

## 2023-08-04 RX ADMIN — SODIUM CHLORIDE 125 MILLILITER(S): 9 INJECTION, SOLUTION INTRAVENOUS at 17:43

## 2023-08-04 RX ADMIN — SODIUM CHLORIDE 2000 MILLILITER(S): 9 INJECTION, SOLUTION INTRAVENOUS at 17:00

## 2023-08-04 RX ADMIN — Medication 975 MILLIGRAM(S): at 17:59

## 2023-08-04 RX ADMIN — Medication 2000 MILLIGRAM(S): at 18:16

## 2023-08-04 RX ADMIN — FAMOTIDINE 20 MILLIGRAM(S): 10 INJECTION INTRAVENOUS at 17:59

## 2023-08-04 NOTE — OB PROVIDER H&P - ASSESSMENT
A/P:   -Admit to L&D  -Consent  -Admission labs  -NPO  -IV fluids  -Fetus: Cat I tracing. Continuous toco and fetal monitoring.   -Analgesia  -DVT ppx: Ambulate and SCD's while in bed     Discussed with     A/P:   -Admit to L&D  -Consent  -Admission labs  -NPO  -IV fluids  -Fetus: Cat I tracing. Continuous toco and fetal monitoring.   -Analgesia  -DVT ppx: Ambulate and SCD's while in bed  A/P: 32y  at 40weeks GA who is being admitted for a pCS in the setting of suspected fetal macrosomia.   -Admit to L&D  -Consent  -Admission labs  -NPO  -IV fluids  -Fetus: Cat I tracing. Continuous toco and fetal monitoring.   -Ancef 2g  -DVT ppx: Ambulate and SCD's while in bed     Discussed with Dr. Soto

## 2023-08-04 NOTE — OB PROVIDER H&P - NSHPPHYSICALEXAM_GEN_ALL_CORE
T(C): 36.6 (08-04-23 @ 16:58), Max: 36.6 (08-04-23 @ 16:50)  HR: 87 (08-04-23 @ 16:58) (87 - 87)  BP: 126/73 (08-04-23 @ 16:58) (126/73 - 126/73)  RR: 16 (08-04-23 @ 16:58) (16 - 16)  SpO2: 99% (08-04-23 @ 16:50) (99% - 99%)  Gen: NAD, well-appearing   Abd: Soft, gravid  Ext: non-tender, non-edematous T(C): 36.6 (08-04-23 @ 16:58), Max: 36.6 (08-04-23 @ 16:50)  HR: 87 (08-04-23 @ 16:58) (87 - 87)  BP: 126/73 (08-04-23 @ 16:58) (126/73 - 126/73)  RR: 16 (08-04-23 @ 16:58) (16 - 16)  SpO2: 99% (08-04-23 @ 16:50) (99% - 99%)  Gen: NAD, well-appearing   Abd: Soft, gravid  Ext: non-tender, non-edematous  FHT: baseline 130s, moderate variability, +accels, -decels   Beckley: no contractions

## 2023-08-04 NOTE — OB RN DELIVERY SUMMARY - NSSELHIDDEN_OBGYN_ALL_OB_FT
[NS_DeliveryAttending1_OBGYN_ALL_OB_FT:[id number]==],[NS_DeliveryRN_OBGYN_ALL_OB_FT:[id number]=]

## 2023-08-04 NOTE — OB RN DELIVERY SUMMARY - NS_SEPSISRSKCALC_OBGYN_ALL_OB_FT
No temperature has been documented for this patient in CPN or on the OB Flowsheet. Ensure the highest temperature during labor was documented on the OB Flowsheet.  No gestational age at birth has been documented. Ensure delivery date/time has been entered above.  Rupture of membranes must be entered above.   EOS calculated successfully. EOS Risk Factor: 0.5/1000 live births ([facility] national incidence); GA=40w;Temp=97.9; ROM=0; GBS='Negative'; Antibiotics='No antibiotics or any antibiotics < 2 hrs prior to birth'

## 2023-08-04 NOTE — OB PROVIDER H&P - BIRTH SEX
Review of Systems  CONSTITUTIONAL: afebrile w/no diaphoresis or weight changes +WEAKNESS  SKIN: warm, dry w/ no rash or bleeding  EYES: no changes to vision  ENT: no changes in hearing, no sore throat  RESPIRATORY: no cough or SOB  CARDIAC: no chest pain & no palpitations  GI: no abd pain, nausea, vomiting, diarrhea, constipation, or blood in stool/giovanni blood  GENITO-URINARY: no discharge, dysuria or hematuria,   MUSCULOSKELETAL:  no joint pain, swelling or redness  NEUROLOGIC: no weakness, headache, anesthesia or paresthesias  PSYCH: no anxiety, non suicidal, non homicidal, without hallucinations or depression
Female

## 2023-08-04 NOTE — OB RN PATIENT PROFILE - TEMPERATURE IN CELSIUS (DEGREES C)
TRANSFER - OUT REPORT:    Verbal report given to Nasima Olivares RN on Darren Huddle  being transferred to [facility] [id number] for routine progression of patient care       Report consisted of patient's Situation, Background, Assessment and   Recommendations(SBAR). Information from the following report(s) Nurse Handoff Report was reviewed with the receiving nurse. [place] Fall Assessment:                           Lines:   Peripheral IV 07/22/23 Left Forearm (Active)        Opportunity for questions and clarification was provided.       Patient transported with:  Monitor   Transport   Pt Mis Zaragoza  07/22/23 1442 36.6

## 2023-08-04 NOTE — OB RN INTRAOPERATIVE NOTE - NSSELHIDDEN_OBGYN_ALL_OB_FT
[NS_DeliveryAttending1_OBGYN_ALL_OB_FT:[id number]==],[NS_DeliveryRN_OBGYN_ALL_OB_FT:[id number]]

## 2023-08-04 NOTE — OB PROVIDER H&P - NSHPLABSRESULTS_GEN_ALL_CORE
LABS    (08-04 @ 17:05)                      13.6  12.07 )-----------( 256                 40.6    Neutrophils = 8.92 (73.9%)  Lymphocytes = 2.11 (17.5%)  Eosinophils = 0.06 (0.5%)  Basophils = 0.03 (0.2%)  Monocytes = 0.89 (7.4%)  Bands = --%

## 2023-08-04 NOTE — OB PROVIDER DELIVERY SUMMARY - NS_GESTAGEATBIRTHA_OBGYN_ALL_OB_FT
Please see e-advice Mom sends below regarding a possible band aid reaction.  Please advise. Thank you!!     Subject: Paco bandaid reaction    This message is being sent by Kathrin Anderson on behalf of Paco Anderson.    Hi Dr. Burroughs,    I forgot to mention in that last message I sent you a concern about Paco. After he received his vaccines the bandaids left red burn marks on his skin. Could he be allergic to the adhesive? This has never happened before. So far just applying hydrocortisone to help soothe his skin. Any suggestions on this matter?     Thank you,  Kathrin Anderson     
Thank you for the update.  It is possible that Paco is sensitive to the adhesive/ bandaid that was applied after his immunizations.  For now, use of hydrocortisone twice daily in a thin layer can help to decrease any redness or itching or swelling.  In the future, we can hold gauze on the site until any bleeding stops, or you can simply remove the bandaid once you are back home.  I would expect less reaction if there is not prolonged contact with the skin.    
40w

## 2023-08-04 NOTE — OB PROVIDER DELIVERY SUMMARY - NSSELHIDDEN_OBGYN_ALL_OB_FT
[NS_DeliveryAttending1_OBGYN_ALL_OB_FT:[id number]==],[NS_DeliveryRN_OBGYN_ALL_OB_FT:[id number]=]

## 2023-08-04 NOTE — OB PROVIDER DELIVERY SUMMARY - NSLOWPPHRISK_OBGYN_A_OB
No previous uterine incision/[place] Pregnancy/Less than or equal to 4 previous vaginal births/No known bleeding disorder/No history of postpartum hemorrhage/No other PPH risks indicated

## 2023-08-04 NOTE — OB PROVIDER H&P - HISTORY OF PRESENT ILLNESS
32y  at 40weeks GA who presents to L&D for CS.   ELMA:23  LMP: 22  Prenatal course is significant for:  1. LGA  2. Obesity    Patient denies vaginal bleeding, contractions and leakage of fluid. She endorses good fetal movement. Denies fevers, chills, nausea and vomiting. No other complaints at this time.     POB:  @ 41 weeks 2020, 8lbs 1 oz F, SAB requiring D&C 22  PGYN: -fibroids, -ovarian cysts, denies STD hx, denies abnormal PAPs   PMH: Denies  PSH: D&C 22  SH: Denies EtOH, tobacco and illicit drug use during this pregnancy; feels safe at home   Meds: PNVs  Allergies: NKDA    BMI: 43.4  Sono: vertex, posterior   EFW:4450g 32y  at 40weeks GA who presents to L&D for CS. Patient denies vaginal bleeding, contractions and leakage of fluid. She endorses good fetal movement. Denies fevers, chills, nausea and vomiting. No other complaints at this time.   ELMA:23  LMP: 22  Prenatal course is significant for:  1. LGA  2. Obesity      POB:  @ 41 weeks 2020, 8lbs 1 oz F, SAB requiring D&C 22  PGYN: -fibroids, -ovarian cysts, denies STD hx, denies abnormal PAPs   PMH: Denies  PSH: D&C 22  SH: Denies EtOH, tobacco and illicit drug use during this pregnancy; feels safe at home   Meds: PNVs  Allergies: NKDA    BMI: 43.4  Sono: vertex, posterior   EFW:4450g

## 2023-08-04 NOTE — OB PROVIDER DELIVERY SUMMARY - NSPROVIDERDELIVERYNOTE_OBGYN_ALL_OB_FT
Primary LFT c/s  Viable Male @ 18:48  Apgar   weight 4480 gm  9 lbs. 14 oz.  Normal Tubes and Ovaries   Placenta intact    cc  Dr. Crowley

## 2023-08-05 ENCOUNTER — TRANSCRIPTION ENCOUNTER (OUTPATIENT)
Age: 33
End: 2023-08-05

## 2023-08-05 DIAGNOSIS — E66.01 MORBID (SEVERE) OBESITY DUE TO EXCESS CALORIES: ICD-10-CM

## 2023-08-05 LAB
BASOPHILS # BLD AUTO: 0.03 K/UL — SIGNIFICANT CHANGE UP (ref 0–0.2)
BASOPHILS NFR BLD AUTO: 0.2 % — SIGNIFICANT CHANGE UP (ref 0–2)
EOSINOPHIL # BLD AUTO: 0.01 K/UL — SIGNIFICANT CHANGE UP (ref 0–0.5)
EOSINOPHIL NFR BLD AUTO: 0.1 % — SIGNIFICANT CHANGE UP (ref 0–6)
HBV SURFACE AB SER-ACNC: REACTIVE
HCT VFR BLD CALC: 34.7 % — SIGNIFICANT CHANGE UP (ref 34.5–45)
HGB BLD-MCNC: 11.1 G/DL — LOW (ref 11.5–15.5)
IMM GRANULOCYTES NFR BLD AUTO: 0.6 % — SIGNIFICANT CHANGE UP (ref 0–0.9)
LYMPHOCYTES # BLD AUTO: 1.71 K/UL — SIGNIFICANT CHANGE UP (ref 1–3.3)
LYMPHOCYTES # BLD AUTO: 11 % — LOW (ref 13–44)
MCHC RBC-ENTMCNC: 28.6 PG — SIGNIFICANT CHANGE UP (ref 27–34)
MCHC RBC-ENTMCNC: 32 GM/DL — SIGNIFICANT CHANGE UP (ref 32–36)
MCV RBC AUTO: 89.4 FL — SIGNIFICANT CHANGE UP (ref 80–100)
MEV IGG SER-ACNC: 24.2 AU/ML — SIGNIFICANT CHANGE UP
MEV IGG+IGM SER-IMP: POSITIVE — SIGNIFICANT CHANGE UP
MONOCYTES # BLD AUTO: 1.08 K/UL — HIGH (ref 0–0.9)
MONOCYTES NFR BLD AUTO: 6.9 % — SIGNIFICANT CHANGE UP (ref 2–14)
NEUTROPHILS # BLD AUTO: 12.63 K/UL — HIGH (ref 1.8–7.4)
NEUTROPHILS NFR BLD AUTO: 81.2 % — HIGH (ref 43–77)
PLATELET # BLD AUTO: 204 K/UL — SIGNIFICANT CHANGE UP (ref 150–400)
RBC # BLD: 3.88 M/UL — SIGNIFICANT CHANGE UP (ref 3.8–5.2)
RBC # FLD: 14.5 % — SIGNIFICANT CHANGE UP (ref 10.3–14.5)
RUBV IGG SER-ACNC: 6.8 INDEX — SIGNIFICANT CHANGE UP
RUBV IGG SER-IMP: POSITIVE — SIGNIFICANT CHANGE UP
T PALLIDUM AB TITR SER: NEGATIVE — SIGNIFICANT CHANGE UP
WBC # BLD: 15.55 K/UL — HIGH (ref 3.8–10.5)
WBC # FLD AUTO: 15.55 K/UL — HIGH (ref 3.8–10.5)

## 2023-08-05 RX ORDER — IBUPROFEN 200 MG
600 TABLET ORAL EVERY 6 HOURS
Refills: 0 | Status: DISCONTINUED | OUTPATIENT
Start: 2023-08-05 | End: 2023-08-06

## 2023-08-05 RX ORDER — ACETAMINOPHEN 500 MG
3 TABLET ORAL
Qty: 60 | Refills: 0
Start: 2023-08-05 | End: 2023-08-09

## 2023-08-05 RX ORDER — IBUPROFEN 200 MG
1 TABLET ORAL
Qty: 20 | Refills: 0
Start: 2023-08-05 | End: 2023-08-09

## 2023-08-05 RX ADMIN — Medication 1 MILLIGRAM(S): at 12:08

## 2023-08-05 RX ADMIN — Medication 975 MILLIGRAM(S): at 21:45

## 2023-08-05 RX ADMIN — Medication 975 MILLIGRAM(S): at 03:45

## 2023-08-05 RX ADMIN — Medication 600 MILLIGRAM(S): at 18:18

## 2023-08-05 RX ADMIN — Medication 30 MILLIGRAM(S): at 05:46

## 2023-08-05 RX ADMIN — SCOPALAMINE 1 PATCH: 1 PATCH, EXTENDED RELEASE TRANSDERMAL at 19:20

## 2023-08-05 RX ADMIN — SCOPALAMINE 1 PATCH: 1 PATCH, EXTENDED RELEASE TRANSDERMAL at 09:37

## 2023-08-05 RX ADMIN — Medication 30 MILLIGRAM(S): at 06:31

## 2023-08-05 RX ADMIN — Medication 30 MILLIGRAM(S): at 00:15

## 2023-08-05 RX ADMIN — MAGNESIUM HYDROXIDE 30 MILLILITER(S): 400 TABLET, CHEWABLE ORAL at 09:34

## 2023-08-05 RX ADMIN — Medication 30 MILLIGRAM(S): at 12:14

## 2023-08-05 RX ADMIN — Medication 975 MILLIGRAM(S): at 02:54

## 2023-08-05 RX ADMIN — Medication 975 MILLIGRAM(S): at 20:30

## 2023-08-05 RX ADMIN — Medication 600 MILLIGRAM(S): at 19:19

## 2023-08-05 RX ADMIN — ENOXAPARIN SODIUM 60 MILLIGRAM(S): 100 INJECTION SUBCUTANEOUS at 12:07

## 2023-08-05 RX ADMIN — Medication 325 MILLIGRAM(S): at 12:08

## 2023-08-05 RX ADMIN — Medication 1 TABLET(S): at 12:08

## 2023-08-05 RX ADMIN — Medication 975 MILLIGRAM(S): at 09:31

## 2023-08-05 RX ADMIN — Medication 975 MILLIGRAM(S): at 15:08

## 2023-08-05 NOTE — DISCHARGE NOTE OB - PATIENT PORTAL LINK FT
You can access the FollowMyHealth Patient Portal offered by [facility] by registering at the following website: [email or web address]. By joining [employer]’s FollowMyHealth portal, you will also be able to view your health information using other applications (apps) compatible with our system.

## 2023-08-05 NOTE — DISCHARGE NOTE OB - CARE PROVIDER_API CALL
Tonya Soto  Obstetrics and Gynecology  [address] [place] [id number]  Phone: [phone]  Fax: [phone]  Follow Up Time:

## 2023-08-05 NOTE — DISCHARGE NOTE OB - PLAN OF CARE
1) Please take ibuprofen and/or Tylenol as needed for pain as prescribed.  2) Nothing in the vagina for 6 weeks (including no sex, no tampons, and no douching).  3) Please call your doctor for a follow up your postpartum appointment in 1-2 weeks.  4) Please continue taking vitamins postpartum.   5) Please call the office sooner if you have heavy vaginal bleeding, severe abdominal pain, or fever > 100.4F.  6) You may resume regular daily activity as tolerated

## 2023-08-05 NOTE — DISCHARGE NOTE OB - MEDICATION SUMMARY - MEDICATIONS TO TAKE
I will START or STAY ON the medications listed below when I get home from the hospital:    ibuprofen 600 mg oral tablet  -- 1 tab(s) by mouth every 6 hours as needed for  pain  -- Indication: For pain    Tylenol 325 mg oral tablet  -- 3 tab(s) by mouth every 6 hours as needed for  pain  -- Indication: For pain

## 2023-08-05 NOTE — DISCHARGE NOTE OB - CARE PLAN
1 Principal Discharge DX:	 delivery delivered  Assessment and plan of treatment:	1) Please take ibuprofen and/or Tylenol as needed for pain as prescribed.  2) Nothing in the vagina for 6 weeks (including no sex, no tampons, and no douching).  3) Please call your doctor for a follow up your postpartum appointment in 1-2 weeks.  4) Please continue taking vitamins postpartum.   5) Please call the office sooner if you have heavy vaginal bleeding, severe abdominal pain, or fever > 100.4F.  6) You may resume regular daily activity as tolerated

## 2023-08-05 NOTE — PROGRESS NOTE ADULT - SUBJECTIVE AND OBJECTIVE BOX
HERBIE SOLIMAN is a 32y  now POD#1 s/p  section at 40w0d gestation, uncomplicated.     S:    The patient has no complaints.   Pain is controlled with current treatment regimen.   She is  tolerating PO.   + flatus/-BM  She endorses appropriate lochia, which is decreasing.   She is breastfeeding and formula feeding without difficulty.     O:    T(C): 36.7 (23 @ 03:00), Max: 36.9 (23 @ 21:41)  HR: 62 (23 @ 03:00) (62 - 92)  BP: 103/66 (23 @ 03:00) (98/54 - 130/60)  RR: 18 (23 @ 03:00) (12 - 18)  SpO2: 97% (23 @ 03:00) (89% - 99%)  Wt(kg): 116.6 kg    Gen: NAD, AOx3  CV: RRR  Pulm: CTAB  Breast: Nontender, not engorged   Abdomen:  Soft, non-tender, non-distended, +bowel sounds.  Uterus:  Fundus firm below umbilicus  Incision:  Clean/dry/intact with steri-strips/staples in place  VE:  +Lochia  Ext:  Non-tender, non-edematous                           13.6   12.07 )-----------( 256      ( 04 Aug 2023 17:05 )             40.6          HERBIE SOLIMAN is a 32y  now POD#1 s/p  section at 40w0d gestation, uncomplicated.     S:    The patient has no complaints.   Pain is controlled with current treatment regimen.   She is  tolerating PO liquids and minimal solids.   + flatus/-BM  She endorses appropriate lochia, which is decreasing.   She is breastfeeding and formula feeding without difficulty.   Denies fevers/chills/nausea/vomiting/lightheadedness/headache/chest pain/shortness of breath/changes in urination or bowel movements.     O:    T(C): 36.7 (23 @ 03:00), Max: 36.9 (23 @ 21:41)  HR: 62 (23 @ 03:00) (62 - 92)  BP: 103/66 (23 @ 03:00) (98/54 - 130/60)  RR: 18 (23 @ 03:00) (12 - 18)  SpO2: 97% (23 @ 03:00) (89% - 99%)  Wt(kg): 116.6 kg    Gen: NAD, AOx3  CV: RRR  Pulm: CTAB  Breast: Nontender, not engorged   Abdomen:  Soft, non-tender, non-distended, +bowel sounds.  Uterus:  Fundus firm below umbilicus  Incision:  Clean/dry/intact, pressure dressing removed and mepilex dressing in place  VE:  +Lochia  Ext:  Non-tender, non-edematous                           13.6   12.07 )-----------( 256      ( 04 Aug 2023 17:05 )             40.6          HERBIE SOLIMAN is a 32y  now POD#1 s/p  section at 40w0d gestation, uncomplicated.     S:    The patient has no complaints.   Pain is controlled with current treatment regimen.   She is  tolerating PO liquids and minimal solids.   + flatus/-BM  She endorses appropriate lochia, which is decreasing.   She is breastfeeding and formula feeding without difficulty.   Denies fevers/chills/nausea/vomiting/lightheadedness/headache/chest pain/shortness of breath/changes in urination or bowel movements.     O:    T(C): 36.7 (23 @ 03:00), Max: 36.9 (23 @ 21:41)  HR: 62 (23 @ 03:00) (62 - 92)  BP: 103/66 (23 @ 03:00) (98/54 - 130/60)  RR: 18 (23 @ 03:00) (12 - 18)  SpO2: 97% (23 @ 03:00) (89% - 99%)  Wt(kg): 116.6 kg    Gen: NAD, AOx3  CV: RRR  Pulm: CTAB  Breast: Nontender, not engorged   Abdomen:  Soft, non-tender, non-distended, +bowel sounds.  Uterus:  Fundus firm below umbilicus  Incision:  Clean/dry/intact, pressure dressing removed and mepilex dressing in place  VE:  +Lochia  Ext:  Non-tender, non-edematous                           13.6   12.07 )-----------( 256      ( 04 Aug 2023 17:05 )             40.6                             11.1   15.55 )-----------( 204      ( 05 Aug 2023 06:47 )             34.7

## 2023-08-05 NOTE — PROGRESS NOTE ADULT - ASSESSMENT
A/P:    -Vital Signs Stable  -Tolerating PO, bowel function nml  -Heme: Hgb * --> AM labs pending   -Advance care as tolerated   -Continue routine postpartum/postoperative care and education  -Healthy male infant, declines circumcision.  -Dispo: Pt is stable for discharge to home pending attending approval.  A/P:    -Vital Signs Stable  -Tolerating PO, bowel function within normal limis  -Heme: Hgb 13.6 --> AM labs pending   -Advance care as tolerated   -Continue routine postpartum/postoperative care and education  -Healthy male infant, declines circumcision.    Dispo: continue inpatient care

## 2023-08-05 NOTE — DISCHARGE NOTE OB - NS MD DC FALL RISK RISK
For information on Fall & Injury Prevention, visit: [email or web address] OR  [email or web address] OR  https://www.cdc.gov/steadi/patient.html

## 2023-08-05 NOTE — PROGRESS NOTE ADULT - ASSESSMENT
INTERVAL HPI/OVERNIGHT EVENTS:  32y Female s/p c section under spinal anesthesia with duramorph for post op analgesia on 8/4/23    Vital Signs Last 24 Hrs  T(C): 36.9 (05 Aug 2023 08:00), Max: 36.9 (04 Aug 2023 21:41)  T(F): 98.4 (05 Aug 2023 08:00), Max: 98.4 (04 Aug 2023 21:41)  HR: 68 (05 Aug 2023 08:00) (62 - 92)  BP: 107/64 (05 Aug 2023 08:00) (103/66 - 130/60)  BP(mean): --  RR: 20 (05 Aug 2023 08:00) (12 - 20)  SpO2: 98% (05 Aug 2023 08:00) (89% - 99%)    Parameters below as of 05 Aug 2023 08:00  Patient On (Oxygen Delivery Method): room air            Patient's overall anesthesia satisfaction: Positive    Patients pain is well controlled with IT duramorph    No respiratory events overnight    No pruritis at this time    Patient doing well     No headache      No residual numbness or weakness, sensory and motor function intact.    No anesthetic complications or complaints noted or reported          .

## 2023-08-06 VITALS
DIASTOLIC BLOOD PRESSURE: 70 MMHG | TEMPERATURE: 98 F | OXYGEN SATURATION: 98 % | SYSTOLIC BLOOD PRESSURE: 102 MMHG | RESPIRATION RATE: 16 BRPM | HEART RATE: 74 BPM

## 2023-08-06 PROCEDURE — 86901 BLOOD TYPING SEROLOGIC RH(D): CPT

## 2023-08-06 PROCEDURE — 86900 BLOOD TYPING SEROLOGIC ABO: CPT

## 2023-08-06 PROCEDURE — 59050 FETAL MONITOR W/REPORT: CPT

## 2023-08-06 PROCEDURE — 86706 HEP B SURFACE ANTIBODY: CPT

## 2023-08-06 PROCEDURE — 86850 RBC ANTIBODY SCREEN: CPT

## 2023-08-06 PROCEDURE — 86762 RUBELLA ANTIBODY: CPT

## 2023-08-06 PROCEDURE — 86780 TREPONEMA PALLIDUM: CPT

## 2023-08-06 PROCEDURE — 59025 FETAL NON-STRESS TEST: CPT

## 2023-08-06 PROCEDURE — 86765 RUBEOLA ANTIBODY: CPT

## 2023-08-06 PROCEDURE — 36415 COLL VENOUS BLD VENIPUNCTURE: CPT

## 2023-08-06 PROCEDURE — 85025 COMPLETE CBC W/AUTO DIFF WBC: CPT

## 2023-08-06 RX ADMIN — Medication 325 MILLIGRAM(S): at 12:19

## 2023-08-06 RX ADMIN — Medication 975 MILLIGRAM(S): at 09:04

## 2023-08-06 RX ADMIN — Medication 600 MILLIGRAM(S): at 00:03

## 2023-08-06 RX ADMIN — Medication 600 MILLIGRAM(S): at 05:59

## 2023-08-06 RX ADMIN — ENOXAPARIN SODIUM 60 MILLIGRAM(S): 100 INJECTION SUBCUTANEOUS at 12:18

## 2023-08-06 RX ADMIN — Medication 1 TABLET(S): at 12:19

## 2023-08-06 RX ADMIN — Medication 975 MILLIGRAM(S): at 16:42

## 2023-08-06 RX ADMIN — Medication 975 MILLIGRAM(S): at 09:30

## 2023-08-06 RX ADMIN — Medication 600 MILLIGRAM(S): at 06:13

## 2023-08-06 RX ADMIN — Medication 1 MILLIGRAM(S): at 12:18

## 2023-08-06 RX ADMIN — Medication 600 MILLIGRAM(S): at 00:30

## 2023-08-06 RX ADMIN — Medication 600 MILLIGRAM(S): at 12:19

## 2023-08-06 NOTE — PROGRESS NOTE ADULT - SUBJECTIVE AND OBJECTIVE BOX
HERBIE SOLIMAN is a 32y  now POD#2 s/p  section at 40w0d gestation, uncomplicated.     S:    The patient has no complaints.   Pain is controlled with current treatment regimen.   She is  tolerating PO intake  + flatus/-BM  She endorses appropriate lochia, which is decreasing.   Denies fevers/chills/nausea/vomiting/lightheadedness/headache/chest pain/shortness of breath/changes in urination or bowel movements.     O:    Vital Signs Last 24 Hrs  T(C): 36.7 (05 Aug 2023 20:00), Max: 36.9 (05 Aug 2023 08:00)  T(F): 98.1 (05 Aug 2023 20:00), Max: 98.4 (05 Aug 2023 08:00)  HR: 74 (05 Aug 2023 20:00) (68 - 74)  BP: 102/67 (05 Aug 2023 20:00) (102/67 - 107/74)  RR: 18 (05 Aug 2023 20:00) (18 - 20)  SpO2: 98% (05 Aug 2023 20:00) (97% - 98%)        Gen: NAD, AOx3  Abdomen:  Soft, non-tender, non-distended, +bowel sounds.  Uterus:  Fundus firm below umbilicus  Incision:  C/s incision intact and well approximated with suture  VE:  +Lochia  Ext:  Non-tender, non-edematous

## 2023-08-06 NOTE — PROGRESS NOTE ADULT - ASSESSMENT
32y  now POD#2 s/p  section at 40w0d gestation, uncomplicated.   -Vital Signs Stable  -Tolerating PO, bowel function within normal limis  -Heme: Hgb 13.6 --> 11.1  -Advance care as tolerated   -Continue routine postpartum/postoperative care and education  -Healthy male infant at bedside, declines circumcision.    Dispo: stable for d/c home today pending attending rounds.

## 2023-08-10 ENCOUNTER — APPOINTMENT (OUTPATIENT)
Dept: OBGYN | Facility: CLINIC | Age: 33
End: 2023-08-10
Payer: MEDICAID

## 2023-08-10 VITALS
WEIGHT: 248 LBS | DIASTOLIC BLOOD PRESSURE: 93 MMHG | HEART RATE: 79 BPM | SYSTOLIC BLOOD PRESSURE: 139 MMHG | BODY MASS INDEX: 42.57 KG/M2

## 2023-08-10 DIAGNOSIS — O99.210 OBESITY COMPLICATING PREGNANCY, UNSPECIFIED TRIMESTER: ICD-10-CM

## 2023-08-10 DIAGNOSIS — Z34.90 ENCOUNTER FOR SUPERVISION OF NORMAL PREGNANCY, UNSPECIFIED, UNSPECIFIED TRIMESTER: ICD-10-CM

## 2023-08-10 DIAGNOSIS — O99.810 ABNORMAL GLUCOSE COMPLICATING PREGNANCY: ICD-10-CM

## 2023-08-10 DIAGNOSIS — Z3A.39 39 WEEKS GESTATION OF PREGNANCY: ICD-10-CM

## 2023-08-10 DIAGNOSIS — O09.299 SUPERVISION OF PREGNANCY WITH OTHER POOR REPRODUCTIVE OR OBSTETRIC HISTORY, UNSPECIFIED TRIMESTER: ICD-10-CM

## 2023-08-10 PROCEDURE — 0503F POSTPARTUM CARE VISIT: CPT

## 2023-08-10 NOTE — HISTORY OF PRESENT ILLNESS
[Delivery Date: ___] : on [unfilled] [Primary C/S] : delivered by  section [Male] : Delivery History: baby boy [Breastfeeding] : currently nursing [Wt. ___] : weighing [unfilled] [BF with Difficulty] : nursing without difficulty [Clean/Dry/Intact] : clean, dry and intact [Erythema] : not erythematous [Swelling] : not swollen [Dehiscence] : not dehisced [Healed] : healed [None] : no vaginal bleeding [Cervix Sample Taken] : cervical sample not taken for a Pap smear [Not Done] : Examination of breasts not done [Doing Well] : is doing well [No Sign of Infection] : is showing no signs of infection [Excellent Pain Control] : has excellent pain control [FreeTextEntry1] : Patient is a 32-year-old status post elective primary  section for LGA fetus on 2023 Baby boy, weight 9 pounds 14 ounces Patient has history of previous traumatic delivery with a vacuum delivery for an 8 pound baby Patient states she is breast-feeding and also supplementing Patient denies any difficulty passing flatus, passing urine, having bowel movements, Incision healing well clean, dry, intact, Benign postop check Follow-up 2 weeks

## 2023-08-25 ENCOUNTER — APPOINTMENT (OUTPATIENT)
Dept: OBGYN | Facility: CLINIC | Age: 33
End: 2023-08-25
Payer: MEDICAID

## 2023-08-25 VITALS
HEART RATE: 92 BPM | SYSTOLIC BLOOD PRESSURE: 129 MMHG | DIASTOLIC BLOOD PRESSURE: 91 MMHG | BODY MASS INDEX: 40.85 KG/M2 | WEIGHT: 238 LBS

## 2023-08-25 PROCEDURE — 0503F POSTPARTUM CARE VISIT: CPT

## 2023-08-25 NOTE — HISTORY OF PRESENT ILLNESS
[Delivery Date: ___] : on [unfilled] [Primary C/S] : delivered by  section [Male] : Delivery History: baby boy [Wt. ___] : weighing [unfilled] [Breastfeeding] : currently nursing [BF with Difficulty] : nursing without difficulty [Clean/Dry/Intact] : clean, dry and intact [Erythema] : not erythematous [Swelling] : not swollen [Dehiscence] : not dehisced [Healed] : healed [None] : no vaginal bleeding [Cervix Sample Taken] : cervical sample not taken for a Pap smear [Not Done] : Examination of breasts not done [Doing Well] : is doing well [No Sign of Infection] : is showing no signs of infection [Excellent Pain Control] : has excellent pain control [FreeTextEntry1] : Patient 33-year-old status post primary  section on  for previous history of traumatic delivery Patient delivered a viable male infant weighing 9 pounds 14 ounces Patient states she is breast-feeding and also supplementing Patient denies any difficulty passing urine, flatus, or bowel movements, Incision clean, dry, intact, healing well Benign postop visit Follow-up 4 weeks

## 2023-09-21 ENCOUNTER — NON-APPOINTMENT (OUTPATIENT)
Age: 33
End: 2023-09-21

## 2023-09-28 ENCOUNTER — APPOINTMENT (OUTPATIENT)
Dept: OBGYN | Facility: CLINIC | Age: 33
End: 2023-09-28
Payer: MEDICAID

## 2023-09-28 VITALS
BODY MASS INDEX: 41.32 KG/M2 | HEART RATE: 84 BPM | SYSTOLIC BLOOD PRESSURE: 128 MMHG | DIASTOLIC BLOOD PRESSURE: 87 MMHG | HEIGHT: 64 IN | WEIGHT: 242 LBS

## 2023-09-28 PROCEDURE — 0503F POSTPARTUM CARE VISIT: CPT

## 2023-12-29 ENCOUNTER — APPOINTMENT (OUTPATIENT)
Dept: OBGYN | Facility: CLINIC | Age: 33
End: 2023-12-29
Payer: MEDICAID

## 2023-12-29 VITALS
WEIGHT: 250 LBS | SYSTOLIC BLOOD PRESSURE: 130 MMHG | HEART RATE: 97 BPM | DIASTOLIC BLOOD PRESSURE: 86 MMHG | BODY MASS INDEX: 42.91 KG/M2

## 2023-12-29 DIAGNOSIS — Z98.891 HISTORY OF UTERINE SCAR FROM PREVIOUS SURGERY: ICD-10-CM

## 2023-12-29 DIAGNOSIS — N39.0 URINARY TRACT INFECTION, SITE NOT SPECIFIED: ICD-10-CM

## 2023-12-29 PROCEDURE — 99213 OFFICE O/P EST LOW 20 MIN: CPT

## 2023-12-29 RX ORDER — PHENAZOPYRIDINE HYDROCHLORIDE 200 MG/1
200 TABLET ORAL 3 TIMES DAILY
Qty: 6 | Refills: 0 | Status: ACTIVE | COMMUNITY
Start: 2023-12-29 | End: 1900-01-01

## 2023-12-29 RX ORDER — SULFAMETHOXAZOLE AND TRIMETHOPRIM 800; 160 MG/1; MG/1
800-160 TABLET ORAL
Qty: 14 | Refills: 0 | Status: ACTIVE | COMMUNITY
Start: 2023-12-29 | End: 1900-01-01

## 2023-12-29 NOTE — PLAN
[FreeTextEntry1] : Patient is a 33-year-old  3 para 2-0-1-2 last menstrual period 2023 Patient presents for evaluation complaining of lower pelvic pressure and pain that is increasing in severity Physical exam reveals a well-developed well-nourished female no apparent distress,, morbidly obese,, BMI 42 Pelvic Yoel shows normal female external genitalia, vagina no lesions, moderate bladder tenderness, cervix appropriate size no cervical motion tenderness, uterus anteverted small mobile nontender, adnexa no mass nontender Urine sample for culture obtained Patient will be started empirically on antibiotics,, denies any allergies Patient to return for pelvic sonogram to further evaluate the pelvic anatomy  Zelda was present as a chaperone for the entire assessment and examination of this patient

## 2023-12-29 NOTE — PHYSICAL EXAM
[Chaperone Present] : A chaperone was present in the examining room during all aspects of the physical examination [Labia Majora] : normal [Labia Minora] : normal [Normal] : normal [Tenderness] : nontender [Anteversion] : anteverted [Uterine Adnexae] : normal [FreeTextEntry4] : Moderate bladder tenderness [FreeTextEntry5] : No CMT [FreeTextEntry6] : Mobile, nontender

## 2023-12-29 NOTE — HISTORY OF PRESENT ILLNESS
[FreeTextEntry1] : Patient is a 33-year-old  3 para 2-0-1-2 last menstrual period 2023 Patient presents for initial complaining of lower pelvic pain that is somewhat increasing in severity

## 2023-12-30 LAB
APPEARANCE: CLEAR
BILIRUBIN URINE: NEGATIVE
BLOOD URINE: NEGATIVE
COLOR: YELLOW
GLUCOSE QUALITATIVE U: NEGATIVE MG/DL
KETONES URINE: NEGATIVE MG/DL
LEUKOCYTE ESTERASE URINE: NEGATIVE
NITRITE URINE: NEGATIVE
PH URINE: 5.5
PROTEIN URINE: NEGATIVE MG/DL
SPECIFIC GRAVITY URINE: 1.02
UROBILINOGEN URINE: 0.2 MG/DL

## 2024-01-02 ENCOUNTER — ASOB RESULT (OUTPATIENT)
Age: 34
End: 2024-01-02

## 2024-01-02 ENCOUNTER — APPOINTMENT (OUTPATIENT)
Dept: OBGYN | Facility: CLINIC | Age: 34
End: 2024-01-02
Payer: MEDICAID

## 2024-01-02 VITALS
WEIGHT: 249 LBS | BODY MASS INDEX: 42.74 KG/M2 | DIASTOLIC BLOOD PRESSURE: 85 MMHG | SYSTOLIC BLOOD PRESSURE: 132 MMHG | HEART RATE: 81 BPM

## 2024-01-02 PROCEDURE — 76856 US EXAM PELVIC COMPLETE: CPT | Mod: 59

## 2024-01-02 PROCEDURE — 76830 TRANSVAGINAL US NON-OB: CPT

## 2024-01-02 PROCEDURE — 99213 OFFICE O/P EST LOW 20 MIN: CPT

## 2024-01-02 NOTE — HISTORY OF PRESENT ILLNESS
[FreeTextEntry1] : Patient is a 33-year-old  3 para 2-0-1-2 last menstrual period  Patient presents for follow-up after being treated with empiric biotic therapy for complaints of pelvic pain

## 2024-01-02 NOTE — PLAN
[FreeTextEntry1] : Patient is a 33-year-old  3 para 2-0-1-2 last menstrual period 2023 Presents for follow-up after being seen for complaining of pelvic pain has been treated empirically with antibiotic therapy with Bactrim and Pyridium Pelvic sonogram Uterus 6.16 x 7.36 x 4.78 Cervical length 3.86 Endometrium 13.7 mm Right ovary not seen Left ovary 3.59 x 3.55 x 2.72 No adnexal masses No free fluid seen Results of the benign GYN anatomy Results discussed with patient Reassured Patient states she feels slightly better with antibiotic use Patient advised to return for a urine culture after antibiotic treatment has been completed  Patient was seen in the office consultation for discussion and not examined during this visit

## 2024-01-03 LAB — BACTERIA UR CULT: ABNORMAL

## 2024-03-28 ENCOUNTER — NON-APPOINTMENT (OUTPATIENT)
Age: 34
End: 2024-03-28

## 2024-03-28 DIAGNOSIS — Z01.419 ENCOUNTER FOR GYNECOLOGICAL EXAMINATION (GENERAL) (ROUTINE) W/OUT ABNORMAL FINDINGS: ICD-10-CM

## 2024-03-28 DIAGNOSIS — R10.2 PELVIC AND PERINEAL PAIN: ICD-10-CM

## 2024-03-28 DIAGNOSIS — M25.572 PAIN IN RIGHT ANKLE AND JOINTS OF RIGHT FOOT: ICD-10-CM

## 2024-03-28 DIAGNOSIS — M25.571 PAIN IN RIGHT ANKLE AND JOINTS OF RIGHT FOOT: ICD-10-CM

## 2024-03-28 DIAGNOSIS — G89.29 PAIN IN RIGHT ANKLE AND JOINTS OF RIGHT FOOT: ICD-10-CM

## 2024-03-28 DIAGNOSIS — Z12.4 ENCOUNTER FOR SCREENING FOR MALIGNANT NEOPLASM OF CERVIX: ICD-10-CM

## 2024-03-28 DIAGNOSIS — Z12.39 ENCOUNTER FOR OTHER SCREENING FOR MALIGNANT NEOPLASM OF BREAST: ICD-10-CM

## 2024-03-28 DIAGNOSIS — Z11.51 ENCOUNTER FOR SCREENING FOR HUMAN PAPILLOMAVIRUS (HPV): ICD-10-CM

## 2024-03-28 DIAGNOSIS — R39.9 UNSPECIFIED SYMPTOMS AND SIGNS INVOLVING THE GENITOURINARY SYSTEM: ICD-10-CM

## 2024-04-22 ENCOUNTER — APPOINTMENT (OUTPATIENT)
Dept: OBGYN | Facility: CLINIC | Age: 34
End: 2024-04-22

## 2024-10-17 NOTE — PHYSICAL EXAM
[Equivocal] : equivocal Ivana [AP] : anteroposterior [Lateral] : lateral [5___] : [facility] 5[unfilled]/5 [Bilateral] : foot bilaterally [There are no fractures, subluxations or dislocations. No significant abnormalities are seen] : There are no fractures, subluxations or dislocations. No significant abnormalities are seen [FreeTextEntry9] : hyperextends 20 degrees b/l [TWNoteComboBox7] : flexion 130 degrees [] : no gross deformity [FreeTextEntry3] : high arch 0 (no pain/absence of nonverbal indicators of pain)

## 2024-11-18 ENCOUNTER — APPOINTMENT (OUTPATIENT)
Dept: OBGYN | Facility: CLINIC | Age: 34
End: 2024-11-18
Payer: MEDICAID

## 2024-11-18 VITALS
SYSTOLIC BLOOD PRESSURE: 126 MMHG | DIASTOLIC BLOOD PRESSURE: 86 MMHG | WEIGHT: 242 LBS | HEIGHT: 64 IN | HEART RATE: 101 BPM | BODY MASS INDEX: 41.32 KG/M2

## 2024-11-18 DIAGNOSIS — N91.1 SECONDARY AMENORRHEA: ICD-10-CM

## 2024-11-18 DIAGNOSIS — Z11.3 ENCOUNTER FOR SCREENING FOR INFECTIONS WITH A PREDOMINANTLY SEXUAL MODE OF TRANSMISSION: ICD-10-CM

## 2024-11-18 DIAGNOSIS — Z01.419 ENCOUNTER FOR GYNECOLOGICAL EXAMINATION (GENERAL) (ROUTINE) W/OUT ABNORMAL FINDINGS: ICD-10-CM

## 2024-11-18 PROCEDURE — 99459 PELVIC EXAMINATION: CPT

## 2024-11-18 PROCEDURE — 81025 URINE PREGNANCY TEST: CPT

## 2024-11-18 PROCEDURE — 99213 OFFICE O/P EST LOW 20 MIN: CPT

## 2024-11-18 RX ORDER — FOLIC ACID 1 MG/1
1 TABLET ORAL DAILY
Qty: 90 | Refills: 3 | Status: ACTIVE | COMMUNITY
Start: 2024-11-18 | End: 1900-01-01

## 2024-11-20 LAB — HPV HIGH+LOW RISK DNA PNL CVX: NOT DETECTED

## 2024-11-21 LAB — CYTOLOGY CVX/VAG DOC THIN PREP: NORMAL

## 2024-12-02 ENCOUNTER — NON-APPOINTMENT (OUTPATIENT)
Age: 34
End: 2024-12-02

## 2024-12-02 ENCOUNTER — ASOB RESULT (OUTPATIENT)
Age: 34
End: 2024-12-02

## 2024-12-02 ENCOUNTER — APPOINTMENT (OUTPATIENT)
Dept: OBGYN | Facility: CLINIC | Age: 34
End: 2024-12-02
Payer: MEDICAID

## 2024-12-02 VITALS
HEART RATE: 87 BPM | WEIGHT: 245 LBS | HEIGHT: 64 IN | BODY MASS INDEX: 41.83 KG/M2 | DIASTOLIC BLOOD PRESSURE: 80 MMHG | SYSTOLIC BLOOD PRESSURE: 117 MMHG

## 2024-12-02 DIAGNOSIS — Z12.39 ENCOUNTER FOR OTHER SCREENING FOR MALIGNANT NEOPLASM OF BREAST: ICD-10-CM

## 2024-12-02 DIAGNOSIS — N91.1 SECONDARY AMENORRHEA: ICD-10-CM

## 2024-12-02 DIAGNOSIS — Z12.4 ENCOUNTER FOR SCREENING FOR MALIGNANT NEOPLASM OF CERVIX: ICD-10-CM

## 2024-12-02 DIAGNOSIS — Z01.419 ENCOUNTER FOR GYNECOLOGICAL EXAMINATION (GENERAL) (ROUTINE) W/OUT ABNORMAL FINDINGS: ICD-10-CM

## 2024-12-02 DIAGNOSIS — Z11.51 ENCOUNTER FOR SCREENING FOR HUMAN PAPILLOMAVIRUS (HPV): ICD-10-CM

## 2024-12-02 PROCEDURE — 76817 TRANSVAGINAL US OBSTETRIC: CPT

## 2024-12-02 PROCEDURE — 0500F INITIAL PRENATAL CARE VISIT: CPT

## 2024-12-02 PROCEDURE — 81002 URINALYSIS NONAUTO W/O SCOPE: CPT | Mod: NC

## 2024-12-02 PROCEDURE — 76801 OB US < 14 WKS SINGLE FETUS: CPT

## 2024-12-18 ENCOUNTER — APPOINTMENT (OUTPATIENT)
Dept: ANTEPARTUM | Facility: CLINIC | Age: 34
End: 2024-12-18
Payer: MEDICAID

## 2024-12-18 ENCOUNTER — ASOB RESULT (OUTPATIENT)
Age: 34
End: 2024-12-18

## 2024-12-18 ENCOUNTER — NON-APPOINTMENT (OUTPATIENT)
Age: 34
End: 2024-12-18

## 2024-12-18 ENCOUNTER — APPOINTMENT (OUTPATIENT)
Dept: MATERNAL FETAL MEDICINE | Facility: CLINIC | Age: 34
End: 2024-12-18
Payer: MEDICAID

## 2024-12-18 VITALS
RESPIRATION RATE: 16 BRPM | SYSTOLIC BLOOD PRESSURE: 102 MMHG | HEART RATE: 90 BPM | WEIGHT: 244.31 LBS | OXYGEN SATURATION: 99 % | BODY MASS INDEX: 41.71 KG/M2 | HEIGHT: 64 IN | DIASTOLIC BLOOD PRESSURE: 76 MMHG

## 2024-12-18 DIAGNOSIS — O99.211 OBESITY COMPLICATING PREGNANCY, FIRST TRIMESTER: ICD-10-CM

## 2024-12-18 DIAGNOSIS — O09.521 SUPERVISION OF ELDERLY MULTIGRAVIDA, FIRST TRIMESTER: ICD-10-CM

## 2024-12-18 DIAGNOSIS — M51.26 OTHER INTERVERTEBRAL DISC DISPLACEMENT, LUMBAR REGION: ICD-10-CM

## 2024-12-18 DIAGNOSIS — G43.909 MIGRAINE, UNSPECIFIED, NOT INTRACTABLE, W/OUT STATUS MIGRAINOSUS: ICD-10-CM

## 2024-12-18 DIAGNOSIS — Z3A.12 12 WEEKS GESTATION OF PREGNANCY: ICD-10-CM

## 2024-12-18 DIAGNOSIS — M22.42 CHONDROMALACIA PATELLAE, RIGHT KNEE: ICD-10-CM

## 2024-12-18 DIAGNOSIS — Z98.891 HISTORY OF UTERINE SCAR FROM PREVIOUS SURGERY: ICD-10-CM

## 2024-12-18 DIAGNOSIS — N91.1 SECONDARY AMENORRHEA: ICD-10-CM

## 2024-12-18 DIAGNOSIS — Z86.69 PERSONAL HISTORY OF OTHER DISEASES OF THE NERVOUS SYSTEM AND SENSE ORGANS: ICD-10-CM

## 2024-12-18 DIAGNOSIS — M22.41 CHONDROMALACIA PATELLAE, RIGHT KNEE: ICD-10-CM

## 2024-12-18 DIAGNOSIS — Z11.3 ENCOUNTER FOR SCREENING FOR INFECTIONS WITH A PREDOMINANTLY SEXUAL MODE OF TRANSMISSION: ICD-10-CM

## 2024-12-18 DIAGNOSIS — Z87.440 PERSONAL HISTORY OF URINARY (TRACT) INFECTIONS: ICD-10-CM

## 2024-12-18 DIAGNOSIS — O34.219 MATERNAL CARE FOR UNSPECIFIED TYPE SCAR FROM PREVIOUS CESAREAN DELIVERY: ICD-10-CM

## 2024-12-18 DIAGNOSIS — Z98.890 OTHER SPECIFIED POSTPROCEDURAL STATES: ICD-10-CM

## 2024-12-18 PROCEDURE — 76813 OB US NUCHAL MEAS 1 GEST: CPT

## 2024-12-18 PROCEDURE — 36416 COLLJ CAPILLARY BLOOD SPEC: CPT

## 2024-12-18 PROCEDURE — 99205 OFFICE O/P NEW HI 60 MIN: CPT

## 2024-12-18 RX ORDER — ASPIRIN 81 MG/1
81 TABLET, COATED ORAL
Qty: 30 | Refills: 4 | Status: ACTIVE | COMMUNITY
Start: 2024-12-18 | End: 1900-01-01

## 2024-12-19 ENCOUNTER — NON-APPOINTMENT (OUTPATIENT)
Age: 34
End: 2024-12-19

## 2024-12-19 ENCOUNTER — APPOINTMENT (OUTPATIENT)
Dept: OBGYN | Facility: CLINIC | Age: 34
End: 2024-12-19
Payer: MEDICAID

## 2024-12-19 ENCOUNTER — ASOB RESULT (OUTPATIENT)
Age: 34
End: 2024-12-19

## 2024-12-19 ENCOUNTER — APPOINTMENT (OUTPATIENT)
Dept: MATERNAL FETAL MEDICINE | Facility: CLINIC | Age: 34
End: 2024-12-19
Payer: MEDICAID

## 2024-12-19 VITALS
BODY MASS INDEX: 41.66 KG/M2 | SYSTOLIC BLOOD PRESSURE: 112 MMHG | HEIGHT: 64 IN | HEART RATE: 96 BPM | DIASTOLIC BLOOD PRESSURE: 69 MMHG | WEIGHT: 244 LBS | RESPIRATION RATE: 16 BRPM

## 2024-12-19 DIAGNOSIS — Z34.90 ENCOUNTER FOR SUPERVISION OF NORMAL PREGNANCY, UNSPECIFIED, UNSPECIFIED TRIMESTER: ICD-10-CM

## 2024-12-19 DIAGNOSIS — Z13.1 ENCOUNTER FOR SCREENING FOR DIABETES MELLITUS: ICD-10-CM

## 2024-12-19 DIAGNOSIS — Z11.4 ENCOUNTER FOR SCREENING FOR HUMAN IMMUNODEFICIENCY VIRUS [HIV]: ICD-10-CM

## 2024-12-19 DIAGNOSIS — E66.9 OBESITY, UNSPECIFIED: ICD-10-CM

## 2024-12-19 DIAGNOSIS — Z11.59 ENCOUNTER FOR SCREENING FOR OTHER VIRAL DISEASES: ICD-10-CM

## 2024-12-19 DIAGNOSIS — Z11.3 ENCOUNTER FOR SCREENING FOR INFECTIONS WITH A PREDOMINANTLY SEXUAL MODE OF TRANSMISSION: ICD-10-CM

## 2024-12-19 DIAGNOSIS — Z13.29 ENCOUNTER FOR SCREENING FOR OTHER SUSPECTED ENDOCRINE DISORDER: ICD-10-CM

## 2024-12-19 DIAGNOSIS — Z98.890 OTHER SPECIFIED POSTPROCEDURAL STATES: ICD-10-CM

## 2024-12-19 DIAGNOSIS — G43.909 MIGRAINE, UNSPECIFIED, NOT INTRACTABLE, W/OUT STATUS MIGRAINOSUS: ICD-10-CM

## 2024-12-19 DIAGNOSIS — Z13.88 ENCOUNTER FOR SCREENING FOR DISORDER DUE TO EXPOSURE TO CONTAMINANTS: ICD-10-CM

## 2024-12-19 DIAGNOSIS — O09.529 SUPERVISION OF ELDERLY MULTIGRAVIDA, UNSPECIFIED TRIMESTER: ICD-10-CM

## 2024-12-19 DIAGNOSIS — Z13.0 ENCOUNTER FOR SCREENING FOR DISEASES OF THE BLOOD AND BLOOD-FORMING ORGANS AND CERTAIN DISORDERS INVOLVING THE IMMUNE MECHANISM: ICD-10-CM

## 2024-12-19 PROCEDURE — 0502F SUBSEQUENT PRENATAL CARE: CPT

## 2024-12-19 PROCEDURE — 81002 URINALYSIS NONAUTO W/O SCOPE: CPT | Mod: NC

## 2024-12-19 PROCEDURE — 99212 OFFICE O/P EST SF 10 MIN: CPT | Mod: 95

## 2024-12-19 PROCEDURE — 76817 TRANSVAGINAL US OBSTETRIC: CPT

## 2024-12-20 LAB
1ST TRIMESTER SCN+NT PATIENT-IMP: NORMAL
AFP MOM SERPL: 0.82
AFP PERCENTILE: 31.4
AFP SERPL-MCNC: 5.99
AGE AT DELIVERY: 35.9
B-HCG FREE MOM PRCTL SERPL: 24.2
B-HCG FREE MOM SERPL: 0.65
B-HCG FREE SERPL-MCNC: 16.81 NG/ML
BEFORE SCREENING RISK TRISOMY 18/13: NORMAL
CHORION TYPE: NORMAL
CURRENT SMOKER: NORMAL
DIABETES STATUS PATIENT: NORMAL
FET CRL US.MEAS: 50.7 MM
FET NASAL BN: PRESENT
FET NUCHAL FOLD MOM THICKNESS US.MEAS: 1.8
FET TS 21 RISK FROM MAT AGE: NORMAL
GA: NORMAL
GA: NORMAL
HX OF TRISOMY 21 QL: NO
INHIBIN A ADJ MOM SERPL: 0.4
INHIBIN A SERPL-MCNC: 73.7 PG/ML
INHIBIN PERCENTILE: 3.3
MATERNAL RISK FACTORS: NORMAL
MULTIPLE PREGNANCY: NORMAL
NUCHAL  TRANSLUCENCY  MOM: 1.35
PAPP-A ADJ MOM SERPL: 1.16
PAPP-A MOM PRCTL SERPL: 60.8
PAPP-A SERPL-ACNC: 1503 MU/L
PIGF SER-MCNC: 39.69 PG/ML
PLGF MOM: 1.43
PLGF PERCENTILE: 81.7
RECOM F/U: NO
SONOGRAPHER NAME: NORMAL
TEST PERFORMANCE INFO SPEC: NORMAL
TRISOMY 18+13 RISK FETUS: NORMAL
TS 21 RISK CTO FETUS: NORMAL
TS 21 RISK FETUS: NORMAL
US DATE: NORMAL

## 2024-12-30 ENCOUNTER — APPOINTMENT (OUTPATIENT)
Dept: OBGYN | Facility: CLINIC | Age: 34
End: 2024-12-30
Payer: MEDICAID

## 2024-12-30 ENCOUNTER — NON-APPOINTMENT (OUTPATIENT)
Age: 34
End: 2024-12-30

## 2024-12-30 VITALS
HEART RATE: 98 BPM | BODY MASS INDEX: 41.48 KG/M2 | SYSTOLIC BLOOD PRESSURE: 113 MMHG | RESPIRATION RATE: 16 BRPM | HEIGHT: 64 IN | DIASTOLIC BLOOD PRESSURE: 77 MMHG | WEIGHT: 243 LBS

## 2024-12-30 DIAGNOSIS — Z3A.13 13 WEEKS GESTATION OF PREGNANCY: ICD-10-CM

## 2024-12-30 DIAGNOSIS — Z98.891 HISTORY OF UTERINE SCAR FROM PREVIOUS SURGERY: ICD-10-CM

## 2024-12-30 DIAGNOSIS — O09.299 SUPERVISION OF PREGNANCY WITH OTHER POOR REPRODUCTIVE OR OBSTETRIC HISTORY, UNSPECIFIED TRIMESTER: ICD-10-CM

## 2024-12-30 DIAGNOSIS — O99.211 OBESITY COMPLICATING PREGNANCY, FIRST TRIMESTER: ICD-10-CM

## 2024-12-30 DIAGNOSIS — M51.26 OTHER INTERVERTEBRAL DISC DISPLACEMENT, LUMBAR REGION: ICD-10-CM

## 2024-12-30 DIAGNOSIS — Z3A.12 12 WEEKS GESTATION OF PREGNANCY: ICD-10-CM

## 2024-12-30 LAB
BILIRUB UR QL STRIP: NEGATIVE
GLUCOSE UR-MCNC: NEGATIVE
HCG UR QL: 0.2 EU/DL
HGB UR QL STRIP.AUTO: NEGATIVE
KETONES UR-MCNC: NEGATIVE
LEUKOCYTE ESTERASE UR QL STRIP: NEGATIVE
NITRITE UR QL STRIP: NEGATIVE
PH UR STRIP: 5.5
PROT UR STRIP-MCNC: NORMAL
SP GR UR STRIP: >=1.03

## 2024-12-30 PROCEDURE — 81002 URINALYSIS NONAUTO W/O SCOPE: CPT | Mod: NC

## 2024-12-30 PROCEDURE — 0502F SUBSEQUENT PRENATAL CARE: CPT

## 2025-01-02 ENCOUNTER — NON-APPOINTMENT (OUTPATIENT)
Age: 35
End: 2025-01-02

## 2025-01-23 ENCOUNTER — NON-APPOINTMENT (OUTPATIENT)
Age: 35
End: 2025-01-23

## 2025-01-27 ENCOUNTER — APPOINTMENT (OUTPATIENT)
Dept: OBGYN | Facility: CLINIC | Age: 35
End: 2025-01-27
Payer: MEDICAID

## 2025-01-27 VITALS
RESPIRATION RATE: 16 BRPM | HEIGHT: 64 IN | HEART RATE: 118 BPM | SYSTOLIC BLOOD PRESSURE: 121 MMHG | BODY MASS INDEX: 41.15 KG/M2 | WEIGHT: 241 LBS | DIASTOLIC BLOOD PRESSURE: 78 MMHG

## 2025-01-27 DIAGNOSIS — Z11.59 ENCOUNTER FOR SCREENING FOR OTHER VIRAL DISEASES: ICD-10-CM

## 2025-01-27 DIAGNOSIS — Z11.4 ENCOUNTER FOR SCREENING FOR HUMAN IMMUNODEFICIENCY VIRUS [HIV]: ICD-10-CM

## 2025-01-27 DIAGNOSIS — Z13.88 ENCOUNTER FOR SCREENING FOR DISORDER DUE TO EXPOSURE TO CONTAMINANTS: ICD-10-CM

## 2025-01-27 DIAGNOSIS — Z3A.17 17 WEEKS GESTATION OF PREGNANCY: ICD-10-CM

## 2025-01-27 DIAGNOSIS — Z13.0 ENCOUNTER FOR SCREENING FOR DISEASES OF THE BLOOD AND BLOOD-FORMING ORGANS AND CERTAIN DISORDERS INVOLVING THE IMMUNE MECHANISM: ICD-10-CM

## 2025-01-27 DIAGNOSIS — Z13.29 ENCOUNTER FOR SCREENING FOR OTHER SUSPECTED ENDOCRINE DISORDER: ICD-10-CM

## 2025-01-27 DIAGNOSIS — Z3A.13 13 WEEKS GESTATION OF PREGNANCY: ICD-10-CM

## 2025-01-27 DIAGNOSIS — R73.09 OTHER ABNORMAL GLUCOSE: ICD-10-CM

## 2025-01-27 PROCEDURE — 0502F SUBSEQUENT PRENATAL CARE: CPT

## 2025-01-27 PROCEDURE — 81002 URINALYSIS NONAUTO W/O SCOPE: CPT

## 2025-01-29 ENCOUNTER — APPOINTMENT (OUTPATIENT)
Dept: ANTEPARTUM | Facility: CLINIC | Age: 35
End: 2025-01-29
Payer: MEDICAID

## 2025-01-29 PROCEDURE — 36415 COLL VENOUS BLD VENIPUNCTURE: CPT

## 2025-01-30 LAB
1ST TRIMESTER SCN+NT PATIENT-IMP: NORMAL
AFP INTERP SERPL-IMP: NORMAL
AFP INTERP SERPL-IMP: NORMAL
AFP MOM CUT-OFF: 2.5
AFP MOM SERPL: 0.75
AFP PERCENTILE: 18.7
AFP SERPL-ACNC: 23.01 NG/ML
AGE AT DELIVERY: 35.9
B-HCG FREE MOM PRCTL SERPL: 17.2
B-HCG FREE MOM SERPL: 0.57
B-HCG FREE SERPL-MCNC: 4.37 NG/ML
CARBAMAZEPINE?: NO
COLLECT DATE: NORMAL
CURRENT SMOKER: NO
DIABETES STATUS PATIENT: NO
FET CRL US.MEAS: 50.7 MM
FET NASAL BN: PRESENT
FET NUCHAL FOLD MOM THICKNESS US.MEAS: 1.8 MM
FET TS 18 PRIOR RISK FROM MAT AGE: NORMAL
FET TS 21 RISK FROM MAT AGE: NORMAL
GA: NORMAL
HX OF NTD NARR: NORMAL
HX OF TRISOMY 21 QL: NO
INHIBIN A ADJ MOM SERPL: 0.31
INHIBIN PERCENTILE: 0.2
INHIBIN SERPL-MCNC: 38.2 PG/ML
MATERNAL RISK FACTORS: NORMAL
MULTIPLE PREGNANCY: NORMAL
NEURAL TUBE DEFECT RISK FETUS: NORMAL
NEURAL TUBE DEFECT RISK POP: NORMAL
NUCHAL  TRANSLUCENCY  MOM: 1.35
PAPP-A ADJ MOM SERPL: 1.16
PAPP-A MOM PRCTL SERPL: 60.8
PAPP-A SERPL-ACNC: 1503 MIU/L
RECOM F/U: NO
TEST PERFORMANCE INFO SPEC: NORMAL
TRISOMY 18+13 RISK FETUS: NORMAL
TS 21 RISK CTO FETUS: NORMAL
TS 21 RISK FETUS: NORMAL
U ESTRIOL ADJ MOM SERPL: 0.49
U ESTRIOL SERPL-SCNC: 1.03 NMOL/L
UNCONJUGATED ESTRIOL PERCENTILE: 9.6
US DATE: NORMAL
VALPROIC ACID?: NORMAL

## 2025-02-05 ENCOUNTER — NON-APPOINTMENT (OUTPATIENT)
Age: 35
End: 2025-02-05

## 2025-02-06 ENCOUNTER — NON-APPOINTMENT (OUTPATIENT)
Age: 35
End: 2025-02-06

## 2025-02-21 ENCOUNTER — APPOINTMENT (OUTPATIENT)
Dept: ANTEPARTUM | Facility: CLINIC | Age: 35
End: 2025-02-21
Payer: MEDICAID

## 2025-02-21 ENCOUNTER — ASOB RESULT (OUTPATIENT)
Age: 35
End: 2025-02-21

## 2025-02-21 PROCEDURE — 76811 OB US DETAILED SNGL FETUS: CPT

## 2025-02-21 PROCEDURE — 76817 TRANSVAGINAL US OBSTETRIC: CPT

## 2025-02-24 ENCOUNTER — NON-APPOINTMENT (OUTPATIENT)
Age: 35
End: 2025-02-24

## 2025-02-24 ENCOUNTER — APPOINTMENT (OUTPATIENT)
Dept: OBGYN | Facility: CLINIC | Age: 35
End: 2025-02-24
Payer: MEDICAID

## 2025-02-24 VITALS
SYSTOLIC BLOOD PRESSURE: 120 MMHG | BODY MASS INDEX: 41.48 KG/M2 | HEIGHT: 64 IN | WEIGHT: 243 LBS | DIASTOLIC BLOOD PRESSURE: 80 MMHG | HEART RATE: 109 BPM

## 2025-02-24 DIAGNOSIS — O34.219 MATERNAL CARE FOR UNSPECIFIED TYPE SCAR FROM PREVIOUS CESAREAN DELIVERY: ICD-10-CM

## 2025-02-24 DIAGNOSIS — Z3A.21 21 WEEKS GESTATION OF PREGNANCY: ICD-10-CM

## 2025-02-24 DIAGNOSIS — O09.521 SUPERVISION OF ELDERLY MULTIGRAVIDA, FIRST TRIMESTER: ICD-10-CM

## 2025-02-24 DIAGNOSIS — Z3A.17 17 WEEKS GESTATION OF PREGNANCY: ICD-10-CM

## 2025-02-24 LAB
BILIRUB UR QL STRIP: NEGATIVE
GLUCOSE UR-MCNC: NEGATIVE
HCG UR QL: 0.2 EU/DL
HGB UR QL STRIP.AUTO: NEGATIVE
KETONES UR-MCNC: NEGATIVE
LEUKOCYTE ESTERASE UR QL STRIP: NEGATIVE
NITRITE UR QL STRIP: NEGATIVE
PH UR STRIP: 6
PROT UR STRIP-MCNC: NEGATIVE
SP GR UR STRIP: >=1.03

## 2025-02-24 PROCEDURE — 81002 URINALYSIS NONAUTO W/O SCOPE: CPT

## 2025-02-24 PROCEDURE — 0502F SUBSEQUENT PRENATAL CARE: CPT

## 2025-03-03 ENCOUNTER — APPOINTMENT (OUTPATIENT)
Dept: ANTEPARTUM | Facility: CLINIC | Age: 35
End: 2025-03-03

## 2025-03-03 ENCOUNTER — ASOB RESULT (OUTPATIENT)
Age: 35
End: 2025-03-03

## 2025-03-03 PROCEDURE — 76816 OB US FOLLOW-UP PER FETUS: CPT

## 2025-04-10 ENCOUNTER — APPOINTMENT (OUTPATIENT)
Dept: OBGYN | Facility: CLINIC | Age: 35
End: 2025-04-10
Payer: MEDICAID

## 2025-04-10 ENCOUNTER — NON-APPOINTMENT (OUTPATIENT)
Age: 35
End: 2025-04-10

## 2025-04-10 VITALS
BODY MASS INDEX: 41.83 KG/M2 | SYSTOLIC BLOOD PRESSURE: 118 MMHG | DIASTOLIC BLOOD PRESSURE: 76 MMHG | WEIGHT: 245 LBS | HEART RATE: 108 BPM | HEIGHT: 64 IN

## 2025-04-10 DIAGNOSIS — Z13.1 ENCOUNTER FOR SCREENING FOR DIABETES MELLITUS: ICD-10-CM

## 2025-04-10 DIAGNOSIS — Z3A.28 28 WEEKS GESTATION OF PREGNANCY: ICD-10-CM

## 2025-04-10 DIAGNOSIS — Z3A.21 21 WEEKS GESTATION OF PREGNANCY: ICD-10-CM

## 2025-04-10 LAB
BILIRUB UR QL STRIP: NORMAL
GLUCOSE UR-MCNC: NORMAL
HCG UR QL: 0.2 EU/DL
HGB UR QL STRIP.AUTO: NORMAL
KETONES UR-MCNC: NORMAL
LEUKOCYTE ESTERASE UR QL STRIP: NORMAL
NITRITE UR QL STRIP: NORMAL
PH UR STRIP: 6
PROT UR STRIP-MCNC: NORMAL
SP GR UR STRIP: >=1.03

## 2025-04-10 PROCEDURE — 0502F SUBSEQUENT PRENATAL CARE: CPT

## 2025-04-10 PROCEDURE — 81002 URINALYSIS NONAUTO W/O SCOPE: CPT

## 2025-04-14 ENCOUNTER — ASOB RESULT (OUTPATIENT)
Age: 35
End: 2025-04-14

## 2025-04-14 ENCOUNTER — APPOINTMENT (OUTPATIENT)
Dept: MATERNAL FETAL MEDICINE | Facility: CLINIC | Age: 35
End: 2025-04-14
Payer: MEDICAID

## 2025-04-14 ENCOUNTER — APPOINTMENT (OUTPATIENT)
Dept: ANTEPARTUM | Facility: CLINIC | Age: 35
End: 2025-04-14
Payer: MEDICAID

## 2025-04-14 DIAGNOSIS — O99.810 ABNORMAL GLUCOSE COMPLICATING PREGNANCY: ICD-10-CM

## 2025-04-14 PROCEDURE — 76816 OB US FOLLOW-UP PER FETUS: CPT

## 2025-04-14 PROCEDURE — G0108 DIAB MANAGE TRN  PER INDIV: CPT | Mod: 95

## 2025-04-15 RX ORDER — LANCETS 28 GAUGE
EACH MISCELLANEOUS
Qty: 1 | Refills: 3 | Status: ACTIVE | COMMUNITY
Start: 2025-04-14 | End: 1900-01-01

## 2025-04-15 RX ORDER — CONTAINER,EMPTY
EACH MISCELLANEOUS
Qty: 1 | Refills: 0 | Status: ACTIVE | COMMUNITY
Start: 2025-04-14 | End: 2025-05-14

## 2025-04-15 RX ORDER — BLOOD SUGAR DIAGNOSTIC
STRIP MISCELLANEOUS
Qty: 1 | Refills: 3 | Status: ACTIVE | COMMUNITY
Start: 2025-04-14 | End: 1900-01-01

## 2025-04-15 RX ORDER — BLOOD-GLUCOSE METER
KIT MISCELLANEOUS
Qty: 1 | Refills: 0 | Status: ACTIVE | COMMUNITY
Start: 2025-04-14 | End: 1900-01-01

## 2025-04-28 ENCOUNTER — APPOINTMENT (OUTPATIENT)
Dept: MATERNAL FETAL MEDICINE | Facility: CLINIC | Age: 35
End: 2025-04-28

## 2025-04-29 ENCOUNTER — NON-APPOINTMENT (OUTPATIENT)
Age: 35
End: 2025-04-29

## 2025-05-01 ENCOUNTER — NON-APPOINTMENT (OUTPATIENT)
Age: 35
End: 2025-05-01

## 2025-05-01 ENCOUNTER — APPOINTMENT (OUTPATIENT)
Dept: OBGYN | Facility: CLINIC | Age: 35
End: 2025-05-01
Payer: MEDICAID

## 2025-05-01 VITALS
WEIGHT: 244 LBS | HEIGHT: 64 IN | BODY MASS INDEX: 41.66 KG/M2 | HEART RATE: 95 BPM | DIASTOLIC BLOOD PRESSURE: 71 MMHG | SYSTOLIC BLOOD PRESSURE: 110 MMHG

## 2025-05-01 DIAGNOSIS — Z3A.28 28 WEEKS GESTATION OF PREGNANCY: ICD-10-CM

## 2025-05-01 DIAGNOSIS — Z3A.31 31 WEEKS GESTATION OF PREGNANCY: ICD-10-CM

## 2025-05-01 PROCEDURE — 0502F SUBSEQUENT PRENATAL CARE: CPT

## 2025-05-01 PROCEDURE — 81002 URINALYSIS NONAUTO W/O SCOPE: CPT

## 2025-05-12 ENCOUNTER — ASOB RESULT (OUTPATIENT)
Age: 35
End: 2025-05-12

## 2025-05-12 ENCOUNTER — APPOINTMENT (OUTPATIENT)
Dept: ANTEPARTUM | Facility: CLINIC | Age: 35
End: 2025-05-12
Payer: MEDICAID

## 2025-05-12 PROCEDURE — 76816 OB US FOLLOW-UP PER FETUS: CPT

## 2025-05-13 ENCOUNTER — NON-APPOINTMENT (OUTPATIENT)
Age: 35
End: 2025-05-13

## 2025-05-13 DIAGNOSIS — O36.60X0 MATERNAL CARE FOR EXCESSIVE FETAL GROWTH, UNSPECIFIED TRIMESTER, NOT APPLICABLE OR UNSPECIFIED: ICD-10-CM

## 2025-05-15 ENCOUNTER — NON-APPOINTMENT (OUTPATIENT)
Age: 35
End: 2025-05-15

## 2025-05-15 ENCOUNTER — APPOINTMENT (OUTPATIENT)
Dept: OBGYN | Facility: CLINIC | Age: 35
End: 2025-05-15
Payer: MEDICAID

## 2025-05-15 VITALS
DIASTOLIC BLOOD PRESSURE: 73 MMHG | HEART RATE: 102 BPM | HEIGHT: 64 IN | SYSTOLIC BLOOD PRESSURE: 110 MMHG | BODY MASS INDEX: 42 KG/M2 | WEIGHT: 246 LBS

## 2025-05-15 DIAGNOSIS — Z3A.33 33 WEEKS GESTATION OF PREGNANCY: ICD-10-CM

## 2025-05-15 DIAGNOSIS — Z3A.31 31 WEEKS GESTATION OF PREGNANCY: ICD-10-CM

## 2025-05-15 PROCEDURE — 81002 URINALYSIS NONAUTO W/O SCOPE: CPT

## 2025-05-15 PROCEDURE — 0502F SUBSEQUENT PRENATAL CARE: CPT

## 2025-05-20 ENCOUNTER — ASOB RESULT (OUTPATIENT)
Age: 35
End: 2025-05-20

## 2025-05-20 ENCOUNTER — APPOINTMENT (OUTPATIENT)
Dept: MATERNAL FETAL MEDICINE | Facility: CLINIC | Age: 35
End: 2025-05-20
Payer: MEDICAID

## 2025-05-20 PROCEDURE — G0108 DIAB MANAGE TRN  PER INDIV: CPT | Mod: 95

## 2025-05-29 ENCOUNTER — NON-APPOINTMENT (OUTPATIENT)
Age: 35
End: 2025-05-29

## 2025-05-29 ENCOUNTER — APPOINTMENT (OUTPATIENT)
Dept: OBGYN | Facility: CLINIC | Age: 35
End: 2025-05-29
Payer: MEDICAID

## 2025-05-29 VITALS
HEART RATE: 92 BPM | SYSTOLIC BLOOD PRESSURE: 111 MMHG | DIASTOLIC BLOOD PRESSURE: 72 MMHG | WEIGHT: 249 LBS | HEIGHT: 64 IN | BODY MASS INDEX: 42.51 KG/M2

## 2025-05-29 DIAGNOSIS — Z3A.33 33 WEEKS GESTATION OF PREGNANCY: ICD-10-CM

## 2025-05-29 PROBLEM — Z3A.35 35 WEEKS GESTATION OF PREGNANCY: Status: ACTIVE | Noted: 2025-05-29

## 2025-05-29 PROBLEM — Z36.85 ENCOUNTER FOR ANTENATAL SCREENING FOR STREPTOCOCCUS B: Status: ACTIVE | Noted: 2025-05-29

## 2025-05-29 PROBLEM — Z11.3 SCREENING EXAMINATION FOR STD (SEXUALLY TRANSMITTED DISEASE): Status: ACTIVE | Noted: 2025-05-29

## 2025-05-29 PROBLEM — Z13.0 SCREENING FOR OTHER AND UNSPECIFIED DEFICIENCY ANEMIA: Status: ACTIVE | Noted: 2025-05-29

## 2025-05-29 PROBLEM — Z11.4 SCREENING FOR HIV (HUMAN IMMUNODEFICIENCY VIRUS): Status: ACTIVE | Noted: 2025-05-29

## 2025-05-29 LAB
BILIRUB UR QL STRIP: NORMAL
CLARITY UR: CLEAR
COLLECTION METHOD: NORMAL
GLUCOSE UR-MCNC: NORMAL
HCG UR QL: 0.2 EU/DL
HGB UR QL STRIP.AUTO: NORMAL
KETONES UR-MCNC: NORMAL
LEUKOCYTE ESTERASE UR QL STRIP: NORMAL
NITRITE UR QL STRIP: NORMAL
PH UR STRIP: 6
PROT UR STRIP-MCNC: NORMAL
SP GR UR STRIP: 1.02

## 2025-05-29 PROCEDURE — 81002 URINALYSIS NONAUTO W/O SCOPE: CPT

## 2025-05-29 PROCEDURE — 0502F SUBSEQUENT PRENATAL CARE: CPT

## 2025-06-02 ENCOUNTER — APPOINTMENT (OUTPATIENT)
Dept: MATERNAL FETAL MEDICINE | Facility: CLINIC | Age: 35
End: 2025-06-02
Payer: MEDICAID

## 2025-06-02 ENCOUNTER — ASOB RESULT (OUTPATIENT)
Age: 35
End: 2025-06-02

## 2025-06-02 LAB
GP B STREP DNA SPEC QL NAA+PROBE: NOT DETECTED
SOURCE GBS: NORMAL

## 2025-06-02 PROCEDURE — G0108 DIAB MANAGE TRN  PER INDIV: CPT | Mod: 95

## 2025-06-05 ENCOUNTER — NON-APPOINTMENT (OUTPATIENT)
Age: 35
End: 2025-06-05

## 2025-06-05 ENCOUNTER — APPOINTMENT (OUTPATIENT)
Dept: OBGYN | Facility: CLINIC | Age: 35
End: 2025-06-05
Payer: MEDICAID

## 2025-06-05 VITALS
HEART RATE: 96 BPM | HEIGHT: 64 IN | DIASTOLIC BLOOD PRESSURE: 66 MMHG | BODY MASS INDEX: 42.51 KG/M2 | WEIGHT: 249 LBS | SYSTOLIC BLOOD PRESSURE: 100 MMHG

## 2025-06-05 DIAGNOSIS — Z3A.35 35 WEEKS GESTATION OF PREGNANCY: ICD-10-CM

## 2025-06-05 DIAGNOSIS — Z11.3 ENCOUNTER FOR SCREENING FOR INFECTIONS WITH A PREDOMINANTLY SEXUAL MODE OF TRANSMISSION: ICD-10-CM

## 2025-06-05 DIAGNOSIS — Z11.4 ENCOUNTER FOR SCREENING FOR HUMAN IMMUNODEFICIENCY VIRUS [HIV]: ICD-10-CM

## 2025-06-05 DIAGNOSIS — O24.419 GESTATIONAL DIABETES MELLITUS IN PREGNANCY, UNSPECIFIED CONTROL: ICD-10-CM

## 2025-06-05 DIAGNOSIS — Z36.85 ENCOUNTER FOR ANTENATAL SCREENING FOR STREPTOCOCCUS B: ICD-10-CM

## 2025-06-05 DIAGNOSIS — Z13.0 ENCOUNTER FOR SCREENING FOR DISEASES OF THE BLOOD AND BLOOD-FORMING ORGANS AND CERTAIN DISORDERS INVOLVING THE IMMUNE MECHANISM: ICD-10-CM

## 2025-06-05 PROBLEM — Z3A.36 36 WEEKS GESTATION OF PREGNANCY: Status: ACTIVE | Noted: 2025-06-05

## 2025-06-05 LAB
BASOPHILS # BLD AUTO: 0.03 K/UL
BASOPHILS NFR BLD AUTO: 0.3 %
EOSINOPHIL # BLD AUTO: 0.11 K/UL
EOSINOPHIL NFR BLD AUTO: 1 %
HCT VFR BLD CALC: 36.8 %
HGB BLD-MCNC: 11.7 G/DL
HIV1+2 AB SPEC QL IA.RAPID: NONREACTIVE
IMM GRANULOCYTES NFR BLD AUTO: 1 %
LYMPHOCYTES # BLD AUTO: 1.69 K/UL
LYMPHOCYTES NFR BLD AUTO: 15.9 %
MAN DIFF?: NORMAL
MCHC RBC-ENTMCNC: 27.7 PG
MCHC RBC-ENTMCNC: 31.8 G/DL
MCV RBC AUTO: 87 FL
MONOCYTES # BLD AUTO: 0.54 K/UL
MONOCYTES NFR BLD AUTO: 5.1 %
NEUTROPHILS # BLD AUTO: 8.15 K/UL
NEUTROPHILS NFR BLD AUTO: 76.7 %
PLATELET # BLD AUTO: 263 K/UL
RBC # BLD: 4.23 M/UL
RBC # FLD: 14.1 %
T PALLIDUM AB SER QL IA: NEGATIVE
WBC # FLD AUTO: 10.63 K/UL

## 2025-06-05 PROCEDURE — 81002 URINALYSIS NONAUTO W/O SCOPE: CPT

## 2025-06-05 PROCEDURE — 0502F SUBSEQUENT PRENATAL CARE: CPT

## 2025-06-06 LAB
BILIRUB UR QL STRIP: NORMAL
CLARITY UR: CLEAR
COLLECTION METHOD: NORMAL
GLUCOSE UR-MCNC: 250
HCG UR QL: 0.2 EU/DL
HGB UR QL STRIP.AUTO: NORMAL
KETONES UR-MCNC: NORMAL
LEUKOCYTE ESTERASE UR QL STRIP: NORMAL
NITRITE UR QL STRIP: NORMAL
PH UR STRIP: 6.5
PROT UR STRIP-MCNC: NORMAL
SP GR UR STRIP: 1.02

## 2025-06-09 ENCOUNTER — APPOINTMENT (OUTPATIENT)
Dept: MATERNAL FETAL MEDICINE | Facility: CLINIC | Age: 35
End: 2025-06-09

## 2025-06-09 ENCOUNTER — ASOB RESULT (OUTPATIENT)
Age: 35
End: 2025-06-09

## 2025-06-09 ENCOUNTER — APPOINTMENT (OUTPATIENT)
Dept: MATERNAL FETAL MEDICINE | Facility: CLINIC | Age: 35
End: 2025-06-09
Payer: MEDICAID

## 2025-06-09 PROCEDURE — 99212 OFFICE O/P EST SF 10 MIN: CPT | Mod: 95

## 2025-06-09 PROCEDURE — G0108 DIAB MANAGE TRN  PER INDIV: CPT | Mod: 95

## 2025-06-10 RX ORDER — METFORMIN HYDROCHLORIDE 850 MG/1
850 TABLET, COATED ORAL
Qty: 1 | Refills: 3 | Status: ACTIVE | COMMUNITY
Start: 2025-06-09 | End: 1900-01-01

## 2025-06-11 ENCOUNTER — APPOINTMENT (OUTPATIENT)
Dept: ANTEPARTUM | Facility: CLINIC | Age: 35
End: 2025-06-11
Payer: MEDICAID

## 2025-06-11 ENCOUNTER — ASOB RESULT (OUTPATIENT)
Age: 35
End: 2025-06-11

## 2025-06-11 PROCEDURE — 76816 OB US FOLLOW-UP PER FETUS: CPT

## 2025-06-11 PROCEDURE — 76818 FETAL BIOPHYS PROFILE W/NST: CPT

## 2025-06-13 ENCOUNTER — APPOINTMENT (OUTPATIENT)
Dept: OBGYN | Facility: CLINIC | Age: 35
End: 2025-06-13
Payer: MEDICAID

## 2025-06-13 VITALS
DIASTOLIC BLOOD PRESSURE: 62 MMHG | HEIGHT: 64 IN | WEIGHT: 248 LBS | SYSTOLIC BLOOD PRESSURE: 93 MMHG | HEART RATE: 108 BPM | BODY MASS INDEX: 42.34 KG/M2

## 2025-06-13 LAB
BILIRUB UR QL STRIP: NEGATIVE
GLUCOSE UR-MCNC: NORMAL
HCG UR QL: 0.2 EU/DL
HGB UR QL STRIP.AUTO: NEGATIVE
KETONES UR-MCNC: NEGATIVE
LEUKOCYTE ESTERASE UR QL STRIP: NEGATIVE
NITRITE UR QL STRIP: NEGATIVE
PH UR STRIP: 6
PROT UR STRIP-MCNC: NORMAL
SP GR UR STRIP: >=1.03

## 2025-06-13 PROCEDURE — 0502F SUBSEQUENT PRENATAL CARE: CPT

## 2025-06-13 PROCEDURE — 81002 URINALYSIS NONAUTO W/O SCOPE: CPT
